# Patient Record
Sex: MALE | Race: WHITE | NOT HISPANIC OR LATINO | Employment: FULL TIME | URBAN - NONMETROPOLITAN AREA
[De-identification: names, ages, dates, MRNs, and addresses within clinical notes are randomized per-mention and may not be internally consistent; named-entity substitution may affect disease eponyms.]

---

## 2020-07-15 ENCOUNTER — HOSPITAL ENCOUNTER (INPATIENT)
Facility: HOSPITAL | Age: 55
LOS: 3 days | DRG: 389 | End: 2020-07-18
Attending: EMERGENCY MEDICINE | Admitting: INTERNAL MEDICINE
Payer: COMMERCIAL

## 2020-07-15 ENCOUNTER — APPOINTMENT (EMERGENCY)
Dept: RADIOLOGY | Facility: HOSPITAL | Age: 55
DRG: 389 | End: 2020-07-15
Payer: COMMERCIAL

## 2020-07-15 ENCOUNTER — APPOINTMENT (EMERGENCY)
Dept: CT IMAGING | Facility: HOSPITAL | Age: 55
DRG: 389 | End: 2020-07-15
Payer: COMMERCIAL

## 2020-07-15 DIAGNOSIS — R11.2 NAUSEA AND VOMITING: Primary | ICD-10-CM

## 2020-07-15 DIAGNOSIS — N17.9 AKI (ACUTE KIDNEY INJURY) (HCC): ICD-10-CM

## 2020-07-15 DIAGNOSIS — K56.609 SBO (SMALL BOWEL OBSTRUCTION) (HCC): ICD-10-CM

## 2020-07-15 DIAGNOSIS — E86.0 DEHYDRATION: ICD-10-CM

## 2020-07-15 DIAGNOSIS — R74.01 TRANSAMINITIS: ICD-10-CM

## 2020-07-15 PROBLEM — E87.1 HYPONATREMIA: Status: ACTIVE | Noted: 2020-07-15

## 2020-07-15 PROBLEM — F10.10 ALCOHOL ABUSE: Status: ACTIVE | Noted: 2020-07-15

## 2020-07-15 PROBLEM — R79.89 ELEVATED LFTS: Status: ACTIVE | Noted: 2020-07-15

## 2020-07-15 PROBLEM — Z72.0 TOBACCO USE: Status: ACTIVE | Noted: 2020-07-15

## 2020-07-15 LAB
ALBUMIN SERPL BCP-MCNC: 3.6 G/DL (ref 3.5–5)
ALBUMIN SERPL BCP-MCNC: 4.3 G/DL (ref 3.5–5)
ALP SERPL-CCNC: 51 U/L (ref 46–116)
ALP SERPL-CCNC: 59 U/L (ref 46–116)
ALT SERPL W P-5'-P-CCNC: 176 U/L (ref 12–78)
ALT SERPL W P-5'-P-CCNC: 222 U/L (ref 12–78)
ANION GAP SERPL CALCULATED.3IONS-SCNC: 13 MMOL/L (ref 4–13)
ANION GAP SERPL CALCULATED.3IONS-SCNC: 8 MMOL/L (ref 4–13)
ANION GAP SERPL CALCULATED.3IONS-SCNC: 8 MMOL/L (ref 4–13)
APTT PPP: 25 SECONDS (ref 23–37)
AST SERPL W P-5'-P-CCNC: 102 U/L (ref 5–45)
AST SERPL W P-5'-P-CCNC: 163 U/L (ref 5–45)
ATRIAL RATE: 100 BPM
BASOPHILS # BLD AUTO: 0.03 THOUSANDS/ΜL (ref 0–0.1)
BASOPHILS NFR BLD AUTO: 1 % (ref 0–1)
BILIRUB DIRECT SERPL-MCNC: 0.25 MG/DL (ref 0–0.2)
BILIRUB SERPL-MCNC: 0.7 MG/DL (ref 0.2–1)
BILIRUB SERPL-MCNC: 0.8 MG/DL (ref 0.2–1)
BUN SERPL-MCNC: 35 MG/DL (ref 5–25)
BUN SERPL-MCNC: 35 MG/DL (ref 5–25)
BUN SERPL-MCNC: 39 MG/DL (ref 5–25)
CALCIUM SERPL-MCNC: 10.4 MG/DL (ref 8.3–10.1)
CALCIUM SERPL-MCNC: 8.9 MG/DL (ref 8.3–10.1)
CALCIUM SERPL-MCNC: 9 MG/DL (ref 8.3–10.1)
CEA SERPL-MCNC: 2 NG/ML (ref 0–3)
CHLORIDE SERPL-SCNC: 89 MMOL/L (ref 100–108)
CHLORIDE SERPL-SCNC: 94 MMOL/L (ref 100–108)
CHLORIDE SERPL-SCNC: 94 MMOL/L (ref 100–108)
CO2 SERPL-SCNC: 26 MMOL/L (ref 21–32)
CO2 SERPL-SCNC: 27 MMOL/L (ref 21–32)
CO2 SERPL-SCNC: 31 MMOL/L (ref 21–32)
CREAT SERPL-MCNC: 1.3 MG/DL (ref 0.6–1.3)
CREAT SERPL-MCNC: 1.34 MG/DL (ref 0.6–1.3)
CREAT SERPL-MCNC: 1.67 MG/DL (ref 0.6–1.3)
CREAT UR-MCNC: 198 MG/DL
EOSINOPHIL # BLD AUTO: 0 THOUSAND/ΜL (ref 0–0.61)
EOSINOPHIL NFR BLD AUTO: 0 % (ref 0–6)
ERYTHROCYTE [DISTWIDTH] IN BLOOD BY AUTOMATED COUNT: 12.3 % (ref 11.6–15.1)
GFR SERPL CREATININE-BSD FRML MDRD: 45 ML/MIN/1.73SQ M
GFR SERPL CREATININE-BSD FRML MDRD: 59 ML/MIN/1.73SQ M
GFR SERPL CREATININE-BSD FRML MDRD: 61 ML/MIN/1.73SQ M
GLUCOSE SERPL-MCNC: 126 MG/DL (ref 65–140)
GLUCOSE SERPL-MCNC: 147 MG/DL (ref 65–140)
GLUCOSE SERPL-MCNC: 96 MG/DL (ref 65–140)
HAV IGM SER QL: NORMAL
HBV CORE IGM SER QL: NORMAL
HBV SURFACE AG SER QL: NORMAL
HCT VFR BLD AUTO: 49.7 % (ref 36.5–49.3)
HCV AB SER QL: NORMAL
HGB BLD-MCNC: 17.3 G/DL (ref 12–17)
IMM GRANULOCYTES # BLD AUTO: 0.02 THOUSAND/UL (ref 0–0.2)
IMM GRANULOCYTES NFR BLD AUTO: 0 % (ref 0–2)
INR PPP: 0.91 (ref 0.84–1.19)
LACTATE SERPL-SCNC: 1.3 MMOL/L (ref 0.5–2)
LACTATE SERPL-SCNC: 1.3 MMOL/L (ref 0.5–2)
LIPASE SERPL-CCNC: 119 U/L (ref 73–393)
LYMPHOCYTES # BLD AUTO: 0.5 THOUSANDS/ΜL (ref 0.6–4.47)
LYMPHOCYTES NFR BLD AUTO: 8 % (ref 14–44)
MAGNESIUM SERPL-MCNC: 1.8 MG/DL (ref 1.6–2.6)
MAGNESIUM SERPL-MCNC: 2 MG/DL (ref 1.6–2.6)
MCH RBC QN AUTO: 33.3 PG (ref 26.8–34.3)
MCHC RBC AUTO-ENTMCNC: 34.8 G/DL (ref 31.4–37.4)
MCV RBC AUTO: 96 FL (ref 82–98)
MONOCYTES # BLD AUTO: 0.63 THOUSAND/ΜL (ref 0.17–1.22)
MONOCYTES NFR BLD AUTO: 10 % (ref 4–12)
NEUTROPHILS # BLD AUTO: 5.06 THOUSANDS/ΜL (ref 1.85–7.62)
NEUTS SEG NFR BLD AUTO: 81 % (ref 43–75)
NRBC BLD AUTO-RTO: 0 /100 WBCS
P AXIS: 82 DEGREES
PHOSPHATE SERPL-MCNC: 5.8 MG/DL (ref 2.7–4.5)
PLATELET # BLD AUTO: 245 THOUSANDS/UL (ref 149–390)
PLATELET # BLD AUTO: 278 THOUSANDS/UL (ref 149–390)
PMV BLD AUTO: 9.2 FL (ref 8.9–12.7)
PMV BLD AUTO: 9.3 FL (ref 8.9–12.7)
POTASSIUM SERPL-SCNC: 3.9 MMOL/L (ref 3.5–5.3)
POTASSIUM SERPL-SCNC: 4.4 MMOL/L (ref 3.5–5.3)
POTASSIUM SERPL-SCNC: 4.6 MMOL/L (ref 3.5–5.3)
PR INTERVAL: 124 MS
PROCALCITONIN SERPL-MCNC: 0.6 NG/ML
PROT SERPL-MCNC: 7.3 G/DL (ref 6.4–8.2)
PROT SERPL-MCNC: 8.7 G/DL (ref 6.4–8.2)
PROTHROMBIN TIME: 12.3 SECONDS (ref 11.6–14.5)
QRS AXIS: 100 DEGREES
QRSD INTERVAL: 84 MS
QT INTERVAL: 312 MS
QTC INTERVAL: 402 MS
RBC # BLD AUTO: 5.2 MILLION/UL (ref 3.88–5.62)
SARS-COV-2 RNA RESP QL NAA+PROBE: NEGATIVE
SODIUM 24H UR-SCNC: 11 MOL/L
SODIUM SERPL-SCNC: 128 MMOL/L (ref 136–145)
SODIUM SERPL-SCNC: 129 MMOL/L (ref 136–145)
SODIUM SERPL-SCNC: 133 MMOL/L (ref 136–145)
T WAVE AXIS: 61 DEGREES
TROPONIN I SERPL-MCNC: <0.02 NG/ML
TROPONIN I SERPL-MCNC: <0.02 NG/ML
TSH SERPL DL<=0.05 MIU/L-ACNC: 1.34 UIU/ML (ref 0.36–3.74)
VENTRICULAR RATE: 100 BPM
WBC # BLD AUTO: 6.24 THOUSAND/UL (ref 4.31–10.16)

## 2020-07-15 PROCEDURE — 36415 COLL VENOUS BLD VENIPUNCTURE: CPT | Performed by: EMERGENCY MEDICINE

## 2020-07-15 PROCEDURE — 71045 X-RAY EXAM CHEST 1 VIEW: CPT

## 2020-07-15 PROCEDURE — 83605 ASSAY OF LACTIC ACID: CPT | Performed by: EMERGENCY MEDICINE

## 2020-07-15 PROCEDURE — 99223 1ST HOSP IP/OBS HIGH 75: CPT | Performed by: FAMILY MEDICINE

## 2020-07-15 PROCEDURE — 84484 ASSAY OF TROPONIN QUANT: CPT | Performed by: INTERNAL MEDICINE

## 2020-07-15 PROCEDURE — 84300 ASSAY OF URINE SODIUM: CPT | Performed by: FAMILY MEDICINE

## 2020-07-15 PROCEDURE — 83605 ASSAY OF LACTIC ACID: CPT | Performed by: FAMILY MEDICINE

## 2020-07-15 PROCEDURE — 85049 AUTOMATED PLATELET COUNT: CPT | Performed by: FAMILY MEDICINE

## 2020-07-15 PROCEDURE — 96376 TX/PRO/DX INJ SAME DRUG ADON: CPT

## 2020-07-15 PROCEDURE — 80053 COMPREHEN METABOLIC PANEL: CPT | Performed by: INTERNAL MEDICINE

## 2020-07-15 PROCEDURE — 85610 PROTHROMBIN TIME: CPT | Performed by: EMERGENCY MEDICINE

## 2020-07-15 PROCEDURE — 80074 ACUTE HEPATITIS PANEL: CPT | Performed by: INTERNAL MEDICINE

## 2020-07-15 PROCEDURE — 74177 CT ABD & PELVIS W/CONTRAST: CPT

## 2020-07-15 PROCEDURE — 93010 ELECTROCARDIOGRAM REPORT: CPT | Performed by: INTERNAL MEDICINE

## 2020-07-15 PROCEDURE — 99285 EMERGENCY DEPT VISIT HI MDM: CPT

## 2020-07-15 PROCEDURE — 85730 THROMBOPLASTIN TIME PARTIAL: CPT | Performed by: EMERGENCY MEDICINE

## 2020-07-15 PROCEDURE — 93005 ELECTROCARDIOGRAM TRACING: CPT

## 2020-07-15 PROCEDURE — 87635 SARS-COV-2 COVID-19 AMP PRB: CPT | Performed by: INTERNAL MEDICINE

## 2020-07-15 PROCEDURE — 80048 BASIC METABOLIC PNL TOTAL CA: CPT | Performed by: FAMILY MEDICINE

## 2020-07-15 PROCEDURE — 84145 PROCALCITONIN (PCT): CPT | Performed by: INTERNAL MEDICINE

## 2020-07-15 PROCEDURE — 99254 IP/OBS CNSLTJ NEW/EST MOD 60: CPT | Performed by: SURGERY

## 2020-07-15 PROCEDURE — 84100 ASSAY OF PHOSPHORUS: CPT | Performed by: INTERNAL MEDICINE

## 2020-07-15 PROCEDURE — 84443 ASSAY THYROID STIM HORMONE: CPT | Performed by: EMERGENCY MEDICINE

## 2020-07-15 PROCEDURE — 80076 HEPATIC FUNCTION PANEL: CPT | Performed by: EMERGENCY MEDICINE

## 2020-07-15 PROCEDURE — 83735 ASSAY OF MAGNESIUM: CPT | Performed by: INTERNAL MEDICINE

## 2020-07-15 PROCEDURE — 84484 ASSAY OF TROPONIN QUANT: CPT | Performed by: EMERGENCY MEDICINE

## 2020-07-15 PROCEDURE — 96361 HYDRATE IV INFUSION ADD-ON: CPT

## 2020-07-15 PROCEDURE — 99285 EMERGENCY DEPT VISIT HI MDM: CPT | Performed by: EMERGENCY MEDICINE

## 2020-07-15 PROCEDURE — 80048 BASIC METABOLIC PNL TOTAL CA: CPT | Performed by: EMERGENCY MEDICINE

## 2020-07-15 PROCEDURE — 96374 THER/PROPH/DIAG INJ IV PUSH: CPT

## 2020-07-15 PROCEDURE — 85025 COMPLETE CBC W/AUTO DIFF WBC: CPT | Performed by: EMERGENCY MEDICINE

## 2020-07-15 PROCEDURE — 82570 ASSAY OF URINE CREATININE: CPT | Performed by: FAMILY MEDICINE

## 2020-07-15 PROCEDURE — 83735 ASSAY OF MAGNESIUM: CPT | Performed by: EMERGENCY MEDICINE

## 2020-07-15 PROCEDURE — 82378 CARCINOEMBRYONIC ANTIGEN: CPT | Performed by: FAMILY MEDICINE

## 2020-07-15 PROCEDURE — 83690 ASSAY OF LIPASE: CPT | Performed by: EMERGENCY MEDICINE

## 2020-07-15 RX ORDER — SODIUM CHLORIDE 9 MG/ML
100 INJECTION, SOLUTION INTRAVENOUS CONTINUOUS
Status: DISCONTINUED | OUTPATIENT
Start: 2020-07-15 | End: 2020-07-18 | Stop reason: HOSPADM

## 2020-07-15 RX ORDER — LIDOCAINE HYDROCHLORIDE 20 MG/ML
1 JELLY TOPICAL ONCE
Status: COMPLETED | OUTPATIENT
Start: 2020-07-15 | End: 2020-07-15

## 2020-07-15 RX ORDER — ONDANSETRON 2 MG/ML
4 INJECTION INTRAMUSCULAR; INTRAVENOUS EVERY 4 HOURS PRN
Status: DISCONTINUED | OUTPATIENT
Start: 2020-07-15 | End: 2020-07-18 | Stop reason: HOSPADM

## 2020-07-15 RX ORDER — NICOTINE 21 MG/24HR
1 PATCH, TRANSDERMAL 24 HOURS TRANSDERMAL DAILY
Status: DISCONTINUED | OUTPATIENT
Start: 2020-07-15 | End: 2020-07-18 | Stop reason: HOSPADM

## 2020-07-15 RX ORDER — ONDANSETRON 2 MG/ML
4 INJECTION INTRAMUSCULAR; INTRAVENOUS ONCE
Status: COMPLETED | OUTPATIENT
Start: 2020-07-15 | End: 2020-07-15

## 2020-07-15 RX ORDER — MORPHINE SULFATE 4 MG/ML
4 INJECTION, SOLUTION INTRAMUSCULAR; INTRAVENOUS EVERY 4 HOURS PRN
Status: DISCONTINUED | OUTPATIENT
Start: 2020-07-15 | End: 2020-07-18 | Stop reason: HOSPADM

## 2020-07-15 RX ORDER — LABETALOL 20 MG/4 ML (5 MG/ML) INTRAVENOUS SYRINGE
10 EVERY 4 HOURS PRN
Status: DISCONTINUED | OUTPATIENT
Start: 2020-07-15 | End: 2020-07-18 | Stop reason: HOSPADM

## 2020-07-15 RX ADMIN — LIDOCAINE HYDROCHLORIDE 1 APPLICATION: 20 JELLY TOPICAL at 11:07

## 2020-07-15 RX ADMIN — ONDANSETRON 4 MG: 2 INJECTION INTRAMUSCULAR; INTRAVENOUS at 08:41

## 2020-07-15 RX ADMIN — SODIUM CHLORIDE 125 ML/HR: 0.9 INJECTION, SOLUTION INTRAVENOUS at 11:28

## 2020-07-15 RX ADMIN — SODIUM CHLORIDE 1000 ML: 0.9 INJECTION, SOLUTION INTRAVENOUS at 09:23

## 2020-07-15 RX ADMIN — ONDANSETRON 4 MG: 2 INJECTION INTRAMUSCULAR; INTRAVENOUS at 10:33

## 2020-07-15 RX ADMIN — SODIUM CHLORIDE 1000 ML: 0.9 INJECTION, SOLUTION INTRAVENOUS at 08:03

## 2020-07-15 RX ADMIN — SODIUM CHLORIDE 125 ML/HR: 0.9 INJECTION, SOLUTION INTRAVENOUS at 22:20

## 2020-07-15 RX ADMIN — IOHEXOL 100 ML: 350 INJECTION, SOLUTION INTRAVENOUS at 09:30

## 2020-07-15 RX ADMIN — ENOXAPARIN SODIUM 40 MG: 40 INJECTION SUBCUTANEOUS at 15:20

## 2020-07-15 RX ADMIN — FOLIC ACID: 5 INJECTION, SOLUTION INTRAMUSCULAR; INTRAVENOUS; SUBCUTANEOUS at 11:57

## 2020-07-15 NOTE — ASSESSMENT & PLAN NOTE
Likely secondary to prerenal azotemia in the setting of decreased p o   Intake  Check urine sodium and urine creatinine  No sign of obstructive uropathy on CT scan  Normal saline at 125cc/hr

## 2020-07-15 NOTE — CONSULTS
Consultation -General Surgery  Julisa Burns 53 y o  male MRN: [id number]  Unit/Bed#: RM07 Encounter: [id number]        Inpatient consult to Acute Care Surgery  Consult performed by: Zoë Guevara PA-C  Consult ordered by: Flor Lucero MD          ASSESSMENT:  Problem List     * (Principal) Small bowel obstruction ([facility] [address] )    Alcohol abuse    Tobacco use          52 yo M PMH ETOH abuse, with high grade SBO as seen on CT scan  Abd soft, distended  Nontender  Non peritoneal  No BM/flatus since Sunday  Lactate normal at 1 3    1 4 L of bilious output out of NGT on insertion in ED  Tachycardic, HTN in the ED  improving  Afebrile, no leukocytosis  Hyponatremia, 128  Cr 1 67  Transaminitis, likely 2/2 ETOH abuse  Hepatitis panel pending  Procal pending    Plan: Will plan to treat conservatively with NGT/NPO  No surgical intervention required at this time  If patient does not improve or continues to worsen, will reconsider need for operative management  - admit to medicine  - NGT/NPO/IVF  - CIWA protocol   - prn pain and antiemetic regimen  - encourage oob, ambulation in halls  - serial abdominal exams  - f/u hepatitis panel, procalcitonin    Rest of care per primary medicine team  Discussed with Dr Yomaira Contreras      Reason for Consult / Principal Problem:    HPI: Julisa Burns is a 53y o  year old male PMH ETOH abuse presents with nausea/vomiting since Sunday  Per pt, he experienced some b/l abdominal pain and then started to vomit, states "thought I had the stomach bug"  States emesis was initially intermittent but overnight has worsened significantly  Pain then subsided  CT scan in ED revealed a high grade SBO  Pt denies hx of SBO or these sx in the past  Denies any surgical hx except for a knee arthroscopy  States last BM was Sunday, has not passed flatus since then either  Pt not experiencing any abdominal pain currently  States nausea has significantly improved since NGT placement   Denies fevers, chills, cp, sob, changes to bladder  Hx of ETOH abuse, drinks 6-8 12oz beers every night x10 years  Tobacco use  Denies any elicit drug use  Review of Systems   Constitutional: Positive for appetite change  Negative for chills  Respiratory: Negative for shortness of breath  Cardiovascular: Negative for chest pain  Gastrointestinal: Positive for abdominal distention, abdominal pain, constipation, nausea and vomiting  Negative for diarrhea  Psychiatric/Behavioral: Negative for agitation  All other systems reviewed and are negative  Historical Information   History reviewed  No pertinent past medical history  Past Surgical History:   Procedure Laterality Date    KNEE ARTHROSCOPY       Social History   Social History     Substance and Sexual Activity   Alcohol Use Yes    Alcohol/week: 42 0 standard drinks    Types: 42 Cans of beer per week    Frequency: 4 or more times a week    Drinks per session: 5 or 6    Binge frequency: Daily or almost daily     Social History     Substance and Sexual Activity   Drug Use Not Currently     Social History     Tobacco Use   Smoking Status Current Every Day Smoker    Types: Cigarettes   Smokeless Tobacco Never Used     History reviewed  No pertinent family history  Meds/Allergies       (Not in a hospital admission)  Current Facility-Administered Medications   Medication Dose Route Frequency    folic acid 1 mg, thiamine (VITAMIN B1) 100 mg in sodium chloride 0 9 % 100 mL IV piggyback   Intravenous Daily    Labetalol HCl (NORMODYNE) injection 10 mg  10 mg Intravenous Q4H PRN    sodium chloride 0 9 % infusion  125 mL/hr Intravenous Continuous       No Known Allergies    Objective     Blood pressure (!) 194/100, pulse (!) 111, temperature 97 8 °F (36 6 °C), temperature source Temporal, resp  rate (!) 25, weight 78 5 kg (173 lb 1 oz), SpO2 96 %      Intake/Output Summary (Last 24 hours) at 7/15/2020 1136  Last data filed at 7/15/2020 1109  Gross per 24 hour   Intake 1000 ml   Output 1400 ml   Net -400 ml       PHYSICAL EXAM  Physical Exam   Constitutional: He is oriented to person, place, and time  He appears well-developed and well-nourished  No distress  HENT:   Head: Normocephalic and atraumatic  NGT in place, with bilious output    Eyes: EOM are normal    Neck: Neck supple  Cardiovascular: Regular rhythm and normal heart sounds  Exam reveals no gallop and no friction rub  No murmur heard  tachycardic   Pulmonary/Chest: Effort normal and breath sounds normal  No stridor  No respiratory distress  He has no wheezes  Abdominal: Soft  He exhibits distension  He exhibits no mass  There is no tenderness  There is no rebound and no guarding  Very minimal bowel sounds throughout  Abdomen soft, distended  Nontender  No peritoneal signs  Musculoskeletal: He exhibits no edema or deformity  Neurological: He is alert and oriented to person, place, and time  Skin: Skin is warm and dry  Capillary refill takes less than 2 seconds  He is not diaphoretic  Psychiatric: He has a normal mood and affect  His behavior is normal    Nursing note and vitals reviewed  Lab Results:   I have personally reviewed pertinent lab results    , CBC:   Lab Results   Component Value Date    WBC 6 24 07/15/2020    HGB 17 3 (H) 07/15/2020    HCT 49 7 (H) 07/15/2020    MCV 96 07/15/2020     07/15/2020    MCH 33 3 07/15/2020    MCHC 34 8 07/15/2020    RDW 12 3 07/15/2020    MPV 9 3 07/15/2020    NRBC 0 07/15/2020   , CMP:   Lab Results   Component Value Date    SODIUM 128 (L) 07/15/2020    K 4 6 07/15/2020    CL 89 (L) 07/15/2020    CO2 26 07/15/2020    BUN 35 (H) 07/15/2020    CREATININE 1 67 (H) 07/15/2020    CALCIUM 10 4 (H) 07/15/2020     (H) 07/15/2020     (H) 07/15/2020    ALKPHOS 59 07/15/2020    EGFR 45 07/15/2020   , Coagulation:   Lab Results   Component Value Date    INR 0 91 07/15/2020   , Urinalysis: No results found for: Gregory Arguello, SPECPAUL, HAYDEE, LEUKOCYTESUR, NITRITE, PROTEINUA, GLUCOSEU, KETONESU, BILIRUBINUR, BLOODU, Amylase: No results found for: AMYLASE, Lipase:   Lab Results   Component Value Date    LIPASE 119 07/15/2020     Imaging: I have personally reviewed pertinent reports  7/15 CT abd/pelvis w contrast: IMPRESSION:     High-grade small bowel obstruction with the transition point noted in the right midabdomen (image 66 of series 601)  Maximal small bowel transverse diameter measures approximately 4 5 cm  Marked dilatation of the stomach secondary to the bowel   obstruction  No pneumoperitoneum or ascites      Surgical consultation is recommended        EKG, Pathology, and Other Studies: I have personally reviewed pertinent reports  Counseling / Coordination of Care  Total time spent today  30 minutes  Greater than 50% of total time was spent with the patient and / or family counseling and / or coordination of care           Claribel Lira PA-C  7/15/2020 11:36 AM

## 2020-07-15 NOTE — H&P
H&P Exam - Gerald Tillman 53 y o  male MRN: [id number]    Unit/Bed#: RM07 Encounter: [id number]      * Small bowel obstruction [facility])  Assessment & Plan  Admit to medicine  Surgical consult  NG to low intermittent suction  Supportive care in the form of IV fluids, antiemetics and pain control    Hyponatremia  Assessment & Plan  Likely hypovolemic hyponatremia in the setting of decreased p o  Intake  Recheck BMP in 4 hours    Elevated LFTs  Assessment & Plan  Likely secondary to alcohol use  Hold hepatotoxic agents  Check acute hepatitis panel    Acute renal failure (ARF) (HCC)  Assessment & Plan  Likely secondary to prerenal azotemia in the setting of decreased p o  Intake  Check urine sodium and urine creatinine  No sign of obstructive uropathy on CT scan  Normal saline at 125cc/hr    Tobacco use  Assessment & Plan  Nicotine patch    Alcohol abuse  Assessment & Plan  Drinks 6-8 beers a day  Initiate CIWA protocol    History of Present Illness      Patient is a pleasant 50-year-old male with no significant past medical history or surgical history, presents to the ED today with 3 day history of abdominal pain accompanied by nausea and vomiting  Patient developed abdominal pain is diffuse on Sunday evening  Patient felt that it was a stomach bug and was drinking liquids, last evening he had a hot dog in some crackers but continued to have emesis and presents to the ED today for evaluation  On further questioning patient states that his stool caliber has been thin for last 2 months  He denies any melena hematochezia  No unintentional weight loss  He smokes 2 packs a day and drinks 6-8 beers a day  Last drink was on Sunday  Review of Systems   Gastrointestinal: Positive for abdominal pain, nausea and vomiting  Decrease in stool caliber   All other systems reviewed and are negative  Historical Information   History reviewed  No pertinent past medical history    Past Surgical History:   Procedure Laterality Date    KNEE ARTHROSCOPY       Social History   Social History     Substance and Sexual Activity   Alcohol Use Yes    Alcohol/week: 42 0 standard drinks    Types: 42 Cans of beer per week    Frequency: 4 or more times a week    Drinks per session: 5 or 6    Binge frequency: Daily or almost daily     Social History     Substance and Sexual Activity   Drug Use Not Currently     Social History     Tobacco Use   Smoking Status Current Every Day Smoker    Types: Cigarettes   Smokeless Tobacco Never Used     E-Cigarette Use: Never User     E-Cigarette/Vaping Substances       Family History: non-contributory    Meds/Allergies   all medications and allergies reviewed  No Known Allergies    Objective   First Vitals:   Blood Pressure: (!) 172/104 (07/15/20 0749)  Pulse: (!) 121 (07/15/20 0749)  Temperature: 97 8 °F (36 6 °C) (07/15/20 0749)  Temp Source: Temporal (07/15/20 0749)  Respirations: 17 (07/15/20 0749)  Weight - Scale: 78 5 kg (173 lb 1 oz) (07/15/20 0749)  SpO2: 96 % (07/15/20 0749)    Current Vitals:   Blood Pressure: (!) 194/100 (07/15/20 1000)  Pulse: (!) 111 (07/15/20 1000)  Temperature: 97 8 °F (36 6 °C) (07/15/20 0749)  Temp Source: Temporal (07/15/20 0749)  Respirations: (!) 25 (07/15/20 1000)  Weight - Scale: 78 5 kg (173 lb 1 oz) (07/15/20 0749)  SpO2: 96 % (07/15/20 1000)      Intake/Output Summary (Last 24 hours) at 7/15/2020 1216  Last data filed at 7/15/2020 1109  Gross per 24 hour   Intake 1000 ml   Output 1400 ml   Net -400 ml       Invasive Devices     Peripheral Intravenous Line            Peripheral IV 07/15/20 Left Antecubital less than 1 day          Drain            NG/OG/Enteral Tube Nasogastric 18 Fr Left nares less than 1 day                Physical Exam   Constitutional: He appears well-developed  HENT:   Head: Normocephalic  Mouth/Throat: No oropharyngeal exudate  Eyes: No scleral icterus  Neck: Normal range of motion  No JVD present     Cardiovascular: Regular rhythm  Tachycardic   Pulmonary/Chest: Effort normal and breath sounds normal  No stridor  No respiratory distress  Abdominal: Soft  He exhibits distension  There is tenderness  Musculoskeletal: Normal range of motion  Neurological: He is alert  No cranial nerve deficit  Skin: Skin is warm  Capillary refill takes 2 to 3 seconds  He is not diaphoretic  No erythema  Lab Results:   Lab Results   Component Value Date    WBC 6 24 07/15/2020    HGB 17 3 (H) 07/15/2020    HCT 49 7 (H) 07/15/2020    MCV 96 07/15/2020     07/15/2020     Lab Results   Component Value Date    SODIUM 129 (L) 07/15/2020    K 4 4 07/15/2020    CL 94 (L) 07/15/2020    CO2 27 07/15/2020    AGAP 8 07/15/2020    BUN 35 (H) 07/15/2020    CREATININE 1 30 07/15/2020    GLUC 126 07/15/2020    CALCIUM 9 0 07/15/2020     (H) 07/15/2020     (H) 07/15/2020    ALKPHOS 51 07/15/2020    TP 7 3 07/15/2020    TBILI 0 70 07/15/2020    EGFR 61 07/15/2020       Imaging:   CT abdomen pelvis with contrast   Final Result      High-grade small bowel obstruction with the transition point noted in the right midabdomen (image 66 of series 601)  Maximal small bowel transverse diameter measures approximately 4 5 cm  Marked dilatation of the stomach secondary to the bowel    obstruction  No pneumoperitoneum or ascites  Surgical consultation is recommended  I personally discussed this study with Ashutosh Palmer on 7/15/2020 at 10:05 AM                       Workstation performed: [id number]         XR chest 1 view portable   Final Result      No acute cardiopulmonary disease  Elevated left hemidiaphragm with underlying gaseous distention of the left upper quadrant bowel loops  Consider dedicated abdominal imaging to further evaluate the bowel distention              Workstation performed: [id number]             EKG, Pathology, and Other Studies: Sinus Tach    Code Status: No Order  Advance Directive and Living Will: Power of :    POLST:      Counseling / Coordination of Care:

## 2020-07-15 NOTE — PLAN OF CARE
Problem: PAIN - ADULT  Goal: Verbalizes/displays adequate comfort level or baseline comfort level  Description  Interventions:  - Encourage patient to monitor pain and request assistance  - Assess pain using appropriate pain scale  - Administer analgesics based on type and severity of pain and evaluate response  - Implement non-pharmacological measures as appropriate and evaluate response  - Consider cultural and social influences on pain and pain management  - Notify physician/advanced practitioner if interventions unsuccessful or patient reports new pain  Outcome: Progressing     Problem: INFECTION - ADULT  Goal: Absence or prevention of progression during hospitalization  Description  INTERVENTIONS:  - Assess and monitor for signs and symptoms of infection  - Monitor lab/diagnostic results  - Monitor all insertion sites, i e  indwelling lines, tubes, and drains  - Monitor endotracheal if appropriate and nasal secretions for changes in amount and color  - [place] appropriate cooling/warming therapies per order  - Administer medications as ordered  - Instruct and encourage patient and family to use good hand hygiene technique  - Identify and instruct in appropriate isolation precautions for identified infection/condition  Outcome: Progressing     Problem: SAFETY ADULT  Goal: Patient will remain free of falls  Description  INTERVENTIONS:  - Assess patient frequently for physical needs  -  Identify cognitive and physical deficits and behaviors that affect risk of falls    -  [place] fall precautions as indicated by assessment   - Educate patient/family on patient safety including physical limitations  - Instruct patient to call for assistance with activity based on assessment  - Modify environment to reduce risk of injury  - Consider OT/PT consult to assist with strengthening/mobility  Outcome: Progressing  Goal: Maintain or return to baseline ADL function  Description  INTERVENTIONS:  -  Assess patient's ability to carry out ADLs; assess patient's baseline for ADL function and identify physical deficits which impact ability to perform ADLs (bathing, care of mouth/teeth, toileting, grooming, dressing, etc )  - Assess/evaluate cause of self-care deficits   - Assess range of motion  - Assess patient's mobility; develop plan if impaired  - Assess patient's need for assistive devices and provide as appropriate  - Encourage maximum independence but intervene and supervise when necessary  - Involve family in performance of ADLs  - Assess for home care needs following discharge   - Consider OT consult to assist with ADL evaluation and planning for discharge  - Provide patient education as appropriate  Outcome: Progressing  Goal: Maintain or return mobility status to optimal level  Description  INTERVENTIONS:  - Assess patient's baseline mobility status (ambulation, transfers, stairs, etc )    - Identify cognitive and physical deficits and behaviors that affect mobility  - Identify mobility aids required to assist with transfers and/or ambulation (gait belt, sit-to-stand, lift, walker, cane, etc )  - [place] fall precautions as indicated by assessment  - Record patient progress and toleration of activity level on Mobility SBAR; progress patient to next Phase/Stage  - Instruct patient to call for assistance with activity based on assessment  - Consider rehabilitation consult to assist with strengthening/weightbearing, etc   Outcome: Progressing     Problem: DISCHARGE PLANNING  Goal: Discharge to home or other facility with appropriate resources  Description  INTERVENTIONS:  - Identify barriers to discharge w/patient and caregiver  - Arrange for needed discharge resources and transportation as appropriate  - Identify discharge learning needs (meds, wound care, etc )  - Arrange for interpretive services to assist at discharge as needed  - Refer to Case Management Department for coordinating discharge planning if the patient needs post-hospital services based on physician/advanced practitioner order or complex needs related to functional status, cognitive ability, or social support system  Outcome: Progressing     Problem: Knowledge Deficit  Goal: Patient/family/caregiver demonstrates understanding of disease process, treatment plan, medications, and discharge instructions  Description  Complete learning assessment and assess knowledge base    Interventions:  - Provide teaching at level of understanding  - Provide teaching via preferred learning methods  Outcome: Progressing

## 2020-07-15 NOTE — ASSESSMENT & PLAN NOTE
Surgical input appreciated  NG to low intermittent suction  Supportive care in the form of IV fluids, antiemetics and pain control

## 2020-07-16 ENCOUNTER — APPOINTMENT (INPATIENT)
Dept: RADIOLOGY | Facility: HOSPITAL | Age: 55
DRG: 389 | End: 2020-07-16
Payer: COMMERCIAL

## 2020-07-16 LAB
ALBUMIN SERPL BCP-MCNC: 3.4 G/DL (ref 3.5–5)
ALP SERPL-CCNC: 44 U/L (ref 46–116)
ALT SERPL W P-5'-P-CCNC: 117 U/L (ref 12–78)
ANION GAP SERPL CALCULATED.3IONS-SCNC: 12 MMOL/L (ref 4–13)
AST SERPL W P-5'-P-CCNC: 39 U/L (ref 5–45)
BACTERIA UR QL AUTO: ABNORMAL /HPF
BASOPHILS # BLD MANUAL: 0 THOUSAND/UL (ref 0–0.1)
BASOPHILS NFR MAR MANUAL: 0 % (ref 0–1)
BILIRUB SERPL-MCNC: 0.7 MG/DL (ref 0.2–1)
BILIRUB UR QL STRIP: ABNORMAL
BUN SERPL-MCNC: 44 MG/DL (ref 5–25)
CALCIUM SERPL-MCNC: 8.5 MG/DL (ref 8.3–10.1)
CHLORIDE SERPL-SCNC: 94 MMOL/L (ref 100–108)
CLARITY UR: CLEAR
CO2 SERPL-SCNC: 32 MMOL/L (ref 21–32)
COLOR UR: ABNORMAL
CREAT SERPL-MCNC: 1.3 MG/DL (ref 0.6–1.3)
EOSINOPHIL # BLD MANUAL: 0.03 THOUSAND/UL (ref 0–0.4)
EOSINOPHIL NFR BLD MANUAL: 1 % (ref 0–6)
ERYTHROCYTE [DISTWIDTH] IN BLOOD BY AUTOMATED COUNT: 12.5 % (ref 11.6–15.1)
GFR SERPL CREATININE-BSD FRML MDRD: 61 ML/MIN/1.73SQ M
GLUCOSE SERPL-MCNC: 89 MG/DL (ref 65–140)
GLUCOSE UR STRIP-MCNC: NEGATIVE MG/DL
HCT VFR BLD AUTO: 44.5 % (ref 36.5–49.3)
HGB BLD-MCNC: 15.1 G/DL (ref 12–17)
HGB UR QL STRIP.AUTO: NEGATIVE
HYALINE CASTS #/AREA URNS LPF: ABNORMAL /LPF
KETONES UR STRIP-MCNC: ABNORMAL MG/DL
LEUKOCYTE ESTERASE UR QL STRIP: NEGATIVE
LYMPHOCYTES # BLD AUTO: 0.97 THOUSAND/UL (ref 0.6–4.47)
LYMPHOCYTES # BLD AUTO: 28 % (ref 14–44)
MAGNESIUM SERPL-MCNC: 2.2 MG/DL (ref 1.6–2.6)
MCH RBC QN AUTO: 33.2 PG (ref 26.8–34.3)
MCHC RBC AUTO-ENTMCNC: 33.9 G/DL (ref 31.4–37.4)
MCV RBC AUTO: 98 FL (ref 82–98)
MONOCYTES # BLD AUTO: 0.48 THOUSAND/UL (ref 0–1.22)
MONOCYTES NFR BLD: 14 % (ref 4–12)
NEUTROPHILS # BLD MANUAL: 1.94 THOUSAND/UL (ref 1.85–7.62)
NEUTS BAND NFR BLD MANUAL: 6 % (ref 0–8)
NEUTS SEG NFR BLD AUTO: 50 % (ref 43–75)
NITRITE UR QL STRIP: NEGATIVE
NON-SQ EPI CELLS URNS QL MICRO: ABNORMAL /HPF
NRBC BLD AUTO-RTO: 0 /100 WBCS
PH UR STRIP.AUTO: 5.5 [PH]
PLATELET # BLD AUTO: 257 THOUSANDS/UL (ref 149–390)
PLATELET BLD QL SMEAR: ADEQUATE
PMV BLD AUTO: 9.5 FL (ref 8.9–12.7)
POTASSIUM SERPL-SCNC: 3.4 MMOL/L (ref 3.5–5.3)
PROCALCITONIN SERPL-MCNC: 0.43 NG/ML
PROT SERPL-MCNC: 7 G/DL (ref 6.4–8.2)
PROT UR STRIP-MCNC: ABNORMAL MG/DL
RBC # BLD AUTO: 4.55 MILLION/UL (ref 3.88–5.62)
RBC #/AREA URNS AUTO: ABNORMAL /HPF
SODIUM SERPL-SCNC: 138 MMOL/L (ref 136–145)
SP GR UR STRIP.AUTO: 1.02 (ref 1–1.03)
TOTAL CELLS COUNTED SPEC: 100
UROBILINOGEN UR QL STRIP.AUTO: 1 E.U./DL
VARIANT LYMPHS # BLD AUTO: 1 %
WBC # BLD AUTO: 3.46 THOUSAND/UL (ref 4.31–10.16)
WBC #/AREA URNS AUTO: ABNORMAL /HPF

## 2020-07-16 PROCEDURE — 99232 SBSQ HOSP IP/OBS MODERATE 35: CPT | Performed by: SURGERY

## 2020-07-16 PROCEDURE — 80053 COMPREHEN METABOLIC PANEL: CPT | Performed by: FAMILY MEDICINE

## 2020-07-16 PROCEDURE — 84145 PROCALCITONIN (PCT): CPT | Performed by: INTERNAL MEDICINE

## 2020-07-16 PROCEDURE — 99232 SBSQ HOSP IP/OBS MODERATE 35: CPT | Performed by: FAMILY MEDICINE

## 2020-07-16 PROCEDURE — 83735 ASSAY OF MAGNESIUM: CPT | Performed by: FAMILY MEDICINE

## 2020-07-16 PROCEDURE — 74022 RADEX COMPL AQT ABD SERIES: CPT

## 2020-07-16 PROCEDURE — 85027 COMPLETE CBC AUTOMATED: CPT | Performed by: FAMILY MEDICINE

## 2020-07-16 PROCEDURE — 81001 URINALYSIS AUTO W/SCOPE: CPT | Performed by: FAMILY MEDICINE

## 2020-07-16 PROCEDURE — 85007 BL SMEAR W/DIFF WBC COUNT: CPT | Performed by: FAMILY MEDICINE

## 2020-07-16 RX ORDER — POTASSIUM CHLORIDE 14.9 MG/ML
20 INJECTION INTRAVENOUS
Status: COMPLETED | OUTPATIENT
Start: 2020-07-16 | End: 2020-07-16

## 2020-07-16 RX ADMIN — FOLIC ACID: 5 INJECTION, SOLUTION INTRAMUSCULAR; INTRAVENOUS; SUBCUTANEOUS at 11:21

## 2020-07-16 RX ADMIN — ENOXAPARIN SODIUM 40 MG: 40 INJECTION SUBCUTANEOUS at 08:43

## 2020-07-16 RX ADMIN — SODIUM CHLORIDE 125 ML/HR: 0.9 INJECTION, SOLUTION INTRAVENOUS at 11:15

## 2020-07-16 RX ADMIN — SODIUM CHLORIDE 125 ML/HR: 0.9 INJECTION, SOLUTION INTRAVENOUS at 04:05

## 2020-07-16 RX ADMIN — POTASSIUM CHLORIDE 20 MEQ: 14.9 INJECTION, SOLUTION INTRAVENOUS at 08:44

## 2020-07-16 RX ADMIN — SODIUM CHLORIDE, SODIUM LACTATE, POTASSIUM CHLORIDE, AND CALCIUM CHLORIDE 1000 ML: .6; .31; .03; .02 INJECTION, SOLUTION INTRAVENOUS at 18:03

## 2020-07-16 RX ADMIN — SODIUM CHLORIDE 125 ML/HR: 0.9 INJECTION, SOLUTION INTRAVENOUS at 14:51

## 2020-07-16 RX ADMIN — POTASSIUM CHLORIDE 20 MEQ: 14.9 INJECTION, SOLUTION INTRAVENOUS at 12:15

## 2020-07-16 NOTE — PROGRESS NOTES
Progress Note - General Surgery   Chepe Brooks 53 y o  male MRN: [id number]  Unit/Bed#: 400-01 Encounter: [id number]    Assessment:  52 yo M with high grade SBO as seen on CT scan  Abd distended, but soft  Nontender  No abd pain, n/v    Transaminitis, likely 2/2 ETOH use  Improving   TACO, improving 1 3 (1 67 on admission)     NGT: 4 2 L bilious output    Plan:  - cont NGT due to high output  - NPO/IVF  - f/u CEA  - encourage oob, ambulation  - dvt ppx  - encourage deep breathing, IS  - prn pain and antiemetic regimen    Rest of care per primary medicine team      Subjective/Objective   Chief Complaint: "I feel better"    Subjective: Doing well, lying in bed resting comfortably  No acute complaints  States nausea, vomiting have resolved  Still without abdominal pain  Denies passing of gas or BM  Denies fevers, chills, cp, sob, change to bladder  Objective:     Blood pressure 146/96, pulse 100, temperature 98 2 °F (36 8 °C), resp  rate 18, height 5' 11" (1 803 m), weight 76 9 kg (169 lb 8 5 oz), SpO2 96 %  ,Body mass index is 23 65 kg/m²  Intake/Output Summary (Last 24 hours) at 7/16/2020 0802  Last data filed at 7/16/2020 0501  Gross per 24 hour   Intake 3177 08 ml   Output 6900 ml   Net -3722 92 ml       Invasive Devices     Peripheral Intravenous Line            Peripheral IV 07/15/20 Left Antecubital 1 day          Drain            NG/OG/Enteral Tube Nasogastric 18 Fr Left nares less than 1 day                Physical Exam   Constitutional: He is oriented to person, place, and time  He appears well-developed and well-nourished  No distress  HENT:   Head: Normocephalic and atraumatic  Eyes: EOM are normal    Neck: Neck supple  Cardiovascular: Normal rate, regular rhythm and normal heart sounds  Exam reveals no gallop and no friction rub  No murmur heard  Pulmonary/Chest: Effort normal and breath sounds normal  No stridor  No respiratory distress  He has no wheezes  He has no rales     Abdominal: Soft  He exhibits distension  He exhibits no mass  There is no tenderness  There is no rebound and no guarding  BS decreased but present throughout  Tympanic  Musculoskeletal: He exhibits no edema or deformity  Neurological: He is alert and oriented to person, place, and time  Skin: Skin is warm and dry  Capillary refill takes less than 2 seconds  He is not diaphoretic  Psychiatric: He has a normal mood and affect  His behavior is normal    Nursing note and vitals reviewed  Scheduled Meds:  Current Facility-Administered Medications:  enoxaparin 40 mg Subcutaneous Daily Loly Cisneros MD    folic acid 1 mg, thiamine 100 mg in 0 9% sodium chloride 100 mL IVPB  Intravenous Daily Loly Cisneros MD Last Rate: Stopped (07/15/20 1227)   Labetalol HCl 10 mg Intravenous Q4H PRN Loly Cisneros MD    morphine injection 2 mg Intravenous Q4H PRN Loly Cinseros MD    Or        morphine injection 4 mg Intravenous Q4H PRN Cristofer Rojas MD    nicotine 1 patch Transdermal Daily Loly Cisneros MD    ondansetron 4 mg Intravenous Q4H PRN Loly Cisneros MD    phenol 1 spray Mouth/Throat Q2H PRN Clcesar Ladd PA-C    potassium chloride 20 mEq Intravenous Q3H Clcesar Ladd PA-C    sodium chloride 125 mL/hr Intravenous Continuous Loly Cisneros MD Last Rate: 125 mL/hr (07/16/20 0405)     Continuous Infusions:  sodium chloride 125 mL/hr Last Rate: 125 mL/hr (07/16/20 0405)     PRN Meds: Labetalol HCl    morphine injection **OR** morphine injection    ondansetron    phenol      Lab, Imaging and other studies:  I have personally reviewed pertinent lab results    , CBC:   Lab Results   Component Value Date    WBC 3 46 (L) 07/16/2020    HGB 15 1 07/16/2020    HCT 44 5 07/16/2020    MCV 98 07/16/2020     07/16/2020    MCH 33 2 07/16/2020    MCHC 33 9 07/16/2020    RDW 12 5 07/16/2020    MPV 9 5 07/16/2020    NRBC 0 07/16/2020   , CMP:   Lab Results   Component Value Date    SODIUM 138 07/16/2020    K 3 4 (L) 07/16/2020    CL 94 (L) 07/16/2020    CO2 32 07/16/2020    BUN 44 (H) 07/16/2020    CREATININE 1 30 07/16/2020    CALCIUM 8 5 07/16/2020    AST 39 07/16/2020     (H) 07/16/2020    ALKPHOS 44 (L) 07/16/2020    EGFR 61 07/16/2020   , Coagulation:   Lab Results   Component Value Date    INR 0 91 07/15/2020   , Urinalysis:   Lab Results   Component Value Date    COLORU Jsesi 07/16/2020    CLARITYU Clear 07/16/2020    SPECGRAV 1 020 07/16/2020    PHUR 5 5 07/16/2020    LEUKOCYTESUR Negative 07/16/2020    NITRITE Negative 07/16/2020    GLUCOSEU Negative 07/16/2020    KETONESU 15 (1+) (A) 07/16/2020    BILIRUBINUR Interference- unable to analyze (A) 07/16/2020    BLOODU Negative 07/16/2020   , Amylase: No results found for: AMYLASE, Lipase:   Lab Results   Component Value Date    LIPASE 119 07/15/2020     VTE Pharmacologic Prophylaxis: Enoxaparin (Lovenox)  VTE Mechanical Prophylaxis: sequential compression device      Tammy Rizvi PA-C  7/16/2020 8:02 AM

## 2020-07-16 NOTE — PLAN OF CARE
Problem: PAIN - ADULT  Goal: Verbalizes/displays adequate comfort level or baseline comfort level  Description  Interventions:  - Encourage patient to monitor pain and request assistance  - Assess pain using appropriate pain scale  - Administer analgesics based on type and severity of pain and evaluate response  - Implement non-pharmacological measures as appropriate and evaluate response  - Consider cultural and social influences on pain and pain management  - Notify physician/advanced practitioner if interventions unsuccessful or patient reports new pain  Outcome: Progressing     Problem: INFECTION - ADULT  Goal: Absence or prevention of progression during hospitalization  Description  INTERVENTIONS:  - Assess and monitor for signs and symptoms of infection  - Monitor lab/diagnostic results  - Monitor all insertion sites, i e  indwelling lines, tubes, and drains  - Monitor endotracheal if appropriate and nasal secretions for changes in amount and color  - [place] appropriate cooling/warming therapies per order  - Administer medications as ordered  - Instruct and encourage patient and family to use good hand hygiene technique  - Identify and instruct in appropriate isolation precautions for identified infection/condition  Outcome: Progressing     Problem: SAFETY ADULT  Goal: Patient will remain free of falls  Description  INTERVENTIONS:  - Assess patient frequently for physical needs  -  Identify cognitive and physical deficits and behaviors that affect risk of falls    -  [place] fall precautions as indicated by assessment   - Educate patient/family on patient safety including physical limitations  - Instruct patient to call for assistance with activity based on assessment  - Modify environment to reduce risk of injury  - Consider OT/PT consult to assist with strengthening/mobility  Outcome: Progressing  Goal: Maintain or return to baseline ADL function  Description  INTERVENTIONS:  -  Assess patient's ability to carry out ADLs; assess patient's baseline for ADL function and identify physical deficits which impact ability to perform ADLs (bathing, care of mouth/teeth, toileting, grooming, dressing, etc )  - Assess/evaluate cause of self-care deficits   - Assess range of motion  - Assess patient's mobility; develop plan if impaired  - Assess patient's need for assistive devices and provide as appropriate  - Encourage maximum independence but intervene and supervise when necessary  - Involve family in performance of ADLs  - Assess for home care needs following discharge   - Consider OT consult to assist with ADL evaluation and planning for discharge  - Provide patient education as appropriate  Outcome: Progressing  Goal: Maintain or return mobility status to optimal level  Description  INTERVENTIONS:  - Assess patient's baseline mobility status (ambulation, transfers, stairs, etc )    - Identify cognitive and physical deficits and behaviors that affect mobility  - Identify mobility aids required to assist with transfers and/or ambulation (gait belt, sit-to-stand, lift, walker, cane, etc )  - [place] fall precautions as indicated by assessment  - Record patient progress and toleration of activity level on Mobility SBAR; progress patient to next Phase/Stage  - Instruct patient to call for assistance with activity based on assessment  - Consider rehabilitation consult to assist with strengthening/weightbearing, etc   Outcome: Progressing     Problem: DISCHARGE PLANNING  Goal: Discharge to home or other facility with appropriate resources  Description  INTERVENTIONS:  - Identify barriers to discharge w/patient and caregiver  - Arrange for needed discharge resources and transportation as appropriate  - Identify discharge learning needs (meds, wound care, etc )  - Arrange for interpretive services to assist at discharge as needed  - Refer to Case Management Department for coordinating discharge planning if the patient needs post-hospital services based on physician/advanced practitioner order or complex needs related to functional status, cognitive ability, or social support system  Outcome: Progressing     Problem: Knowledge Deficit  Goal: Patient/family/caregiver demonstrates understanding of disease process, treatment plan, medications, and discharge instructions  Description  Complete learning assessment and assess knowledge base  Interventions:  - Provide teaching at level of understanding  - Provide teaching via preferred learning methods  Outcome: Progressing     Problem: Potential for Falls  Goal: Patient will remain free of falls  Description  INTERVENTIONS:  - Assess patient frequently for physical needs  -  Identify cognitive and physical deficits and behaviors that affect risk of falls    -  [place] fall precautions as indicated by assessment   - Educate patient/family on patient safety including physical limitations  - Instruct patient to call for assistance with activity based on assessment  - Modify environment to reduce risk of injury  - Consider OT/PT consult to assist with strengthening/mobility  Outcome: Progressing

## 2020-07-16 NOTE — PROGRESS NOTES
Progress Note - Kassy Reasons 1965, 53 y o  male MRN: [id number]    Unit/Bed#: 400-01 Encounter: [id number]    Primary Care Provider: Isacc Tillman MD   Date and time admitted to hospital: 7/15/2020  7:44 AM        * Small bowel obstruction [facility])  Assessment & Plan    Surgical input appreciated  NG to low intermittent suction  Supportive care in the form of IV fluids, antiemetics and pain control  Follow up CEA     Hyponatremia  Assessment & Plan  Likely hypovolemic hyponatremia in the setting of decreased p o  Intake  Resolved    Elevated LFTs  Assessment & Plan  Likely secondary to alcohol use  Hold hepatotoxic agents  LFTs improving, hepatitis panel negative    Acute renal failure (ARF) (HCC)  Assessment & Plan  Likely secondary to prerenal azotemia in the setting of decreased p o  Intake  Check urine sodium and urine creatinine  No sign of obstructive uropathy on CT scan  Normal saline at 125cc/hr    Tobacco use  Assessment & Plan  Nicotine patch    Alcohol abuse  Assessment & Plan  Drinks 6-8 beers a day  Initiate CIWA protocol  Will initiate multivitamin once able to take p o  Progress Note - Kassy Reasons 53 y o  male MRN: [id number]    Unit/Bed#: 400-01 Encounter: [id number]        Subjective:   Patient seen examined at bedside, has not passed gas or had a bowel movement, states his belly is less tender    Objective:     Vitals:   Vitals:    07/16/20 0658   BP: 146/96   Pulse: 100   Resp: 18   Temp: 98 2 °F (36 8 °C)   SpO2: 96%     Body mass index is 23 65 kg/m²      Intake/Output Summary (Last 24 hours) at 7/16/2020 0805  Last data filed at 7/16/2020 0501  Gross per 24 hour   Intake 3177 08 ml   Output 6900 ml   Net -3722 92 ml       Physical Exam:   /96   Pulse 100   Temp 98 2 °F (36 8 °C)   Resp 18   Ht 5' 11" (1 803 m)   Wt 76 9 kg (169 lb 8 5 oz)   SpO2 96%   BMI 23 65 kg/m²   General appearance: alert and oriented, in no acute distress  Head: Normocephalic, without obvious abnormality, atraumatic  Lungs: clear to auscultation bilaterally  Heart: regular rate and rhythm, S1, S2 normal, no murmur, click, rub or gallop  Abdomen:  Soft, moderate distention, no rebound guarding or tenderness, no bowel sound  Extremities: extremities normal, warm and well-perfused; no cyanosis, clubbing, or edema  Pulses: 2+ and symmetric  Neurologic: Grossly normal     Invasive Devices     Peripheral Intravenous Line            Peripheral IV 07/15/20 Left Antecubital 1 day          Drain            NG/OG/Enteral Tube Nasogastric 18 Fr Left nares less than 1 day                Results from last 7 days   Lab Units 07/16/20  0433 07/15/20  1500 07/15/20  0805   WBC Thousand/uL 3 46*  --  6 24   HEMOGLOBIN g/dL 15 1  --  17 3*   HEMATOCRIT % 44 5  --  49 7*   PLATELETS Thousands/uL 257 245 278       Results from last 7 days   Lab Units 07/16/20  0433 07/15/20  1835 07/15/20  1145 07/15/20  0805   POTASSIUM mmol/L 3 4* 3 9 4 4 4 6   CHLORIDE mmol/L 94* 94* 94* 89*   CO2 mmol/L 32 31 27 26   BUN mg/dL 44* 39* 35* 35*   CREATININE mg/dL 1 30 1 34* 1 30 1 67*   CALCIUM mg/dL 8 5 8 9 9 0 10 4*   ALK PHOS U/L 44*  --  51 59   ALT U/L 117*  --  176* 222*   AST U/L 39  --  102* 163*       Medication Administration - last 24 hours from 07/15/2020 0805 to 07/16/2020 0805       Date/Time Order Dose Route Action Action by     07/15/2020 0839 sodium chloride 0 9 % bolus 1,000 mL 0 mL Intravenous Stopped Ishaan Kwon RN     07/15/2020 0841 ondansetron (ZOFRAN) injection 4 mg 4 mg Intravenous Given Ishaan Kwon RN     07/15/2020 1025 sodium chloride 0 9 % bolus 1,000 mL 0 mL Intravenous Stopped Jaret Lozoya RN     07/15/2020 0923 sodium chloride 0 9 % bolus 1,000 mL 1,000 mL Intravenous Raven  Jaret Lozoya, [address] [place] [address]     07/15/2020 0930 iohexol (OMNIPAQUE) 350 MG/ML injection (SINGLE-DOSE) 100 mL 100 mL Intravenous Given Julio Cesar Garcias     07/15/2020 1106 lidocaine (URO-JET) 2 % urethral/mucosal gel 1 application 1 application Urethral Given Elmira Mchugh, ASHLEY     07/15/2020 1033 ondansetron (ZOFRAN) injection 4 mg 4 mg Intravenous Given Elmira Mchugh RN     07/16/2020 0405 sodium chloride 0 9 % infusion 125 mL/hr Intravenous New Bag Justin Verduzco, ASHLEY     07/15/2020 2220 sodium chloride 0 9 % infusion 125 mL/hr Intravenous New [address] Justin Verduzco, RN     07/15/2020 1128 sodium chloride 0 9 % infusion 125 mL/hr Intravenous [address] Elmiraxiao Hilliardegel, [facility]     65/07/5523 4188 folic acid 1 mg, thiamine (VITAMIN B1) 100 mg in sodium chloride 0 9 % 100 mL IV piggyback 0  Intravenous Stopped Mirta Elias RN     99/64/1056 6293 folic acid 1 mg, thiamine (VITAMIN B1) 100 mg in sodium chloride 0 9 % 100 mL IV piggyback   Intravenous [address] Mirta Elias RN     07/15/2020 1508 nicotine (NICODERM CQ) 21 mg/24 hr TD 24 hr patch 1 patch 1 patch Transdermal Not Given Olimpia Pulido RN     07/15/2020 1520 enoxaparin (LOVENOX) subcutaneous injection 40 mg 40 mg Subcutaneous Given Olimpia Pulido RN            Lab, Imaging and other studies: I have personally reviewed pertinent reports      VTE Pharmacologic Prophylaxis: Enoxaparin (Lovenox)  VTE Mechanical Prophylaxis: sequential compression device     Anusha Girard MD  7/16/2020,8:05 AM

## 2020-07-16 NOTE — UTILIZATION REVIEW
Notification of Inpatient Admission/Inpatient Authorization Request   This is a Notification of Inpatient Admission for [address]  Be advised that this patient was admitted to our facility under Inpatient Status  Contact Alea Bales at [phone] for additional admission information  [facility] DEPT  DEDICATED [phone]  Patient Name:   Cal Pierre   YOB: 1965       [address]   [address]:   [address]  Tax ID: [id number]  NPI: [id number] Attending Provider/NPI:  Phone:  Address: [address] [[id number]]  [phone]  Same as Facility   Place of Service Code: 24 Place of Service Name:  [address] [place] [address]   Start Date: 7/15/20 1040 Discharge Date & Time: No discharge date for patient encounter  Type of Admission: Inpatient Status Discharge Disposition   (if discharged): Final discharge disposition not confirmed   Patient Diagnoses: Dehydration [E86 0]  Vomiting [R11 10]  SBO (small bowel obstruction) (HCC) [K56 609]  Nausea and vomiting [R11 2]  Transaminitis [R74 0]  TACO (acute kidney injury) ([facility] [address] ) [N17 9]     Orders: Admission Orders (From admission, onward)     Ordered        07/15/20 1040  Inpatient Admission  Once                    Assigned Utilization Review Contact: Alea Bales  Utilization   Network Utilization Review Department  Phone: [phone]; Fax [phone]  Email: [email or web address]   ATTENTION PAYERS: Please call the assigned Utilization  directly with any questions or concerns ALL voicemails in the department are confidential  Send all requests for admission clinical reviews, approved or denied determinations and any other requests to dedicated fax number belonging to the campus where the patient is receiving treatment

## 2020-07-16 NOTE — UTILIZATION REVIEW
Initial Clinical Review    Admission: Date/Time/Statement: Admission Orders (From admission, onward)     Ordered        07/15/20 1040  Inpatient Admission  Once                   Orders Placed This Encounter   Procedures    Inpatient Admission     Standing Status:   Standing     Number of Occurrences:   1     Order Specific Question:   Admitting Physician     Answer:   Jensen Pleasure     Order Specific Question:   Level of Care     Answer:   Med Surg [16]     Order Specific Question:   Estimated length of stay     Answer:   More than 2 Midnights     Order Specific Question:   Certification     Answer:   I certify that inpatient services are medically necessary for this patient for a duration of greater than two midnights  See H&P and MD Progress Notes for additional information about the patient's course of treatment  ED Arrival Information     Expected Arrival Acuity Means of Arrival Escorted By Service Admission Type    - 7/15/2020 07:38 Urgent Walk-In Spouse Hospitalist Urgent    Arrival Complaint    stomach virus        Chief Complaint   Patient presents with    Vomiting     States having stomach virus since Sunday night  Sunday night had 3 beers, felt nauseated  Monday with vomiting, normal BM  Yesterday attempted to eat, vomitted twice  No fevers  Assessment/Plan:   53 yom ambulatory to er from home c/o abd pain , n&v x 3 days, reports stool caliber thin x 2 months  Hx daily drinker @ 6-8 beers/day, smoker  Presents tachycardic & hypertensive with abd distention, pain & tenderness, n&v  Admission work-up showing SBO  Admitted to inpatient status for SBO, NGT placed, started on ivf, surgery consulted  Per surg:  No surgical intervention at this time, conservative tx with NGT      ED Triage Vitals   Temperature Pulse Respirations Blood Pressure SpO2   07/15/20 0749 07/15/20 0749 07/15/20 0749 07/15/20 0749 07/15/20 0749   97 8 °F (36 6 °C) (!) 121 17 (!) 172/104 96 %      Temp Source Heart Rate Source Patient Position - Orthostatic VS BP Location FiO2 (%)   07/15/20 0749 07/15/20 0749 07/15/20 1130 07/15/20 0749 --   Temporal Monitor Sitting Right arm       Pain Score       07/15/20 0749       5        Wt Readings from Last 1 Encounters:   07/15/20 76 9 kg (169 lb 8 5 oz)     Additional Vital Signs:   07/15/20 1000    111Abnormal   25Abnormal   194/100Abnormal   140  96 %           07/15/20 0945    97  20  177/90Abnormal   126  100 %           07/15/20 0800    122Abnormal   23Abnormal   158/95    96 %      None (Room air)     07/15/20 0754                  None (Room air)     07/15/20 0749  97 8 °F (36 6 °C)  121Abnormal   17  172/104Abnormal     96 %      None (Room air)     Pertinent Labs/Diagnostic Test Results:   Results from last 7 days   Lab Units 07/15/20  1132   SARS-COV-2  Negative     Results from last 7 days   Lab Units 07/16/20  0433 07/15/20  1500 07/15/20  0805   WBC Thousand/uL 3 46*  --  6 24   HEMOGLOBIN g/dL 15 1  --  17 3*   HEMATOCRIT % 44 5  --  49 7*   PLATELETS Thousands/uL 257 245 278   NEUTROS ABS Thousands/µL  --   --  5 06   BANDS PCT % 6  --   --      Results from last 7 days   Lab Units 07/16/20  0433 07/15/20  1835 07/15/20  1145 07/15/20  0805   SODIUM mmol/L 138 133* 129* 128*   POTASSIUM mmol/L 3 4* 3 9 4 4 4 6   CHLORIDE mmol/L 94* 94* 94* 89*   CO2 mmol/L 32 31 27 26   ANION GAP mmol/L 12 8 8 13   BUN mg/dL 44* 39* 35* 35*   CREATININE mg/dL 1 30 1 34* 1 30 1 67*   EGFR ml/min/1 73sq m 61 59 61 45   CALCIUM mg/dL 8 5 8 9 9 0 10 4*   MAGNESIUM mg/dL 2 2  --  1 8 2 0   PHOSPHORUS mg/dL  --   --  5 8*  --      Results from last 7 days   Lab Units 07/16/20  0433 07/15/20  1145 07/15/20  0805   AST U/L 39 102* 163*   ALT U/L 117* 176* 222*   ALK PHOS U/L 44* 51 59   TOTAL PROTEIN g/dL 7 0 7 3 8 7*   ALBUMIN g/dL 3 4* 3 6 4 3   TOTAL BILIRUBIN mg/dL 0 70 0 70 0 80   BILIRUBIN DIRECT mg/dL  --   --  0 25*     Results from last 7 days   Lab Units 07/16/20  0433 07/15/20  1835 07/15/20  1145 07/15/20  0805   GLUCOSE RANDOM mg/dL 89 96 126 147*     Results from last 7 days   Lab Units 07/15/20  1145 07/15/20  0805   TROPONIN I ng/mL <0 02 <0 02     Results from last 7 days   Lab Units 07/15/20  0805   PROTIME seconds 12 3   INR  0 91   PTT seconds 25     Results from last 7 days   Lab Units 07/15/20  0805   TSH 3RD GENERATON uIU/mL 1 342     Results from last 7 days   Lab Units 07/16/20  0433 07/15/20  1145   PROCALCITONIN ng/ml 0 43* 0 60*     Results from last 7 days   Lab Units 07/15/20  1459 07/15/20  1034   LACTIC ACID mmol/L 1 3 1 3     Results from last 7 days   Lab Units 07/15/20  1145   HEP B S AG  Non-reactive   HEP C AB  Non-reactive   HEP B C IGM  Non-reactive     Results from last 7 days   Lab Units 07/15/20  0805   LIPASE u/L 119     Results from last 7 days   Lab Units 07/15/20  1258   CEA ng/mL 2 0     Results from last 7 days   Lab Units 07/16/20  0118 07/15/20  1453   CLARITY UA  Clear  --    COLOR UA  Jessi  --    SPEC GRAV UA  1 020  --    PH UA  5 5  --    GLUCOSE UA mg/dl Negative  --    KETONES UA mg/dl 15 (1+)*  --    BLOOD UA  Negative  --    PROTEIN UA mg/dl 30 (1+)*  --    NITRITE UA  Negative  --    BILIRUBIN UA  Interference- unable to analyze*  --    UROBILINOGEN UA E U /dl 1 0  --    LEUKOCYTES UA  Negative  --    WBC UA /hpf None Seen  --    RBC UA /hpf 0-1*  --    BACTERIA UA /hpf Occasional  --    EPITHELIAL CELLS WET PREP /hpf None Seen  --    SODIUM UR   --  11   CREATININE UR mg/dL  --  198 0     Results from last 7 days   Lab Units 07/16/20  0433   TOTAL COUNTED  100     7/15  Cxr=No acute cardiopulmonary disease  Elevated left hemidiaphragm with underlying gaseous distention of the left upper quadrant bowel loops  Consider dedicated abdominal imaging to further evaluate the bowel distention  Ct a/p=High-grade small bowel obstruction with the transition point noted in the right midabdomen (image 66 of series 601)  Maximal small bowel transverse diameter measures approximately 4 5 cm  Marked dilatation of the stomach secondary to the bowel   obstruction  No pneumoperitoneum or ascites  Surgical consultation is recommended  7/16  Obstruction series=Nasogastric tube has been placed with tip in the stomach  There is unchanged severe small bowel distention consistent with small bowel obstruction      ED Treatment:   Medication Administration from 07/15/2020 0738 to 07/15/2020 1411       Date/Time Order Dose Route Action     07/15/2020 0803 sodium chloride 0 9 % bolus 1,000 mL 1,000 mL Intravenous New Bag     07/15/2020 0841 ondansetron (ZOFRAN) injection 4 mg 4 mg Intravenous Given     07/15/2020 0923 sodium chloride 0 9 % bolus 1,000 mL 1,000 mL Intravenous New Bag     07/15/2020 0930 iohexol (OMNIPAQUE) 350 MG/ML injection (SINGLE-DOSE) 100 mL 100 mL Intravenous Given     07/15/2020 1107 lidocaine (URO-JET) 2 % urethral/mucosal gel 1 application 1 application Urethral Given     07/15/2020 1033 ondansetron (ZOFRAN) injection 4 mg 4 mg Intravenous Given     07/15/2020 1128 sodium chloride 0 9 % infusion 125 mL/hr Intravenous New Bag     90/29/6104 7318 folic acid 1 mg, thiamine (VITAMIN B1) 100 mg in sodium chloride 0 9 % 100 mL IV piggyback   Intravenous New Bag        Admitting Diagnosis: Dehydration [E86 0]  Vomiting [R11 10]  SBO (small bowel obstruction) ([facility]) [K56 609]  Nausea and vomiting [R11 2]  Transaminitis [R74 0]  TACO (acute kidney injury) ([facility] [address] ) [N17 9]  Age/Sex: 53 y o  male  Admission Orders:  Npo/NGT  WA protocol  Consult surgery  Scd/foot pumps  Incentive spirometry    Scheduled Medications:  enoxaparin 40 mg Subcutaneous Daily   folic acid 1 mg, thiamine 100 mg in 0 9% sodium chloride 100 mL IVPB  Intravenous Daily   nicotine 1 patch Transdermal Daily   potassium chloride 20 mEq Intravenous Q3H     Continuous IV Infusions:  sodium chloride 125 mL/hr Intravenous Continuous     PRN Meds:  Labetalol HCl 10 mg Intravenous Q4H PRN   morphine injection 2 mg Intravenous Q4H PRN   Or      morphine injection 4 mg Intravenous Q4H PRN   ondansetron 4 mg Intravenous Q4H PRN   phenol 1 spray Mouth/Throat Q2H PRN     Network Utilization Review Department  [email or web address]  ATTENTION: Please call with any questions or concerns to [phone] and carefully listen to the prompts so that you are directed to the right person  All voicemails are confidential   Galina Farias all requests for admission clinical reviews, approved or denied determinations and any other requests to dedicated fax number below belonging to the campus where the patient is receiving treatment   List of dedicated fax numbers for the Facilities:  [address] DENIALS (Administrative/Medical Necessity) [phone]   [address] (Maternity/NICU/Pediatrics) [phone]   Aishwarya Momin [phone]   Michelle Gan [phone]   Cullen Opitz [phone]   Hernesto Figueredo [phone]   [facility] [facility] [phone]   [facility]  [phone]   [address] [facility]  [facility] [phone]

## 2020-07-17 LAB
ALBUMIN SERPL BCP-MCNC: 3.2 G/DL (ref 3.5–5)
ALP SERPL-CCNC: 40 U/L (ref 46–116)
ALT SERPL W P-5'-P-CCNC: 68 U/L (ref 12–78)
ANION GAP SERPL CALCULATED.3IONS-SCNC: 7 MMOL/L (ref 4–13)
AST SERPL W P-5'-P-CCNC: 19 U/L (ref 5–45)
BILIRUB SERPL-MCNC: 0.7 MG/DL (ref 0.2–1)
BUN SERPL-MCNC: 35 MG/DL (ref 5–25)
CALCIUM SERPL-MCNC: 8.5 MG/DL (ref 8.3–10.1)
CHLORIDE SERPL-SCNC: 100 MMOL/L (ref 100–108)
CO2 SERPL-SCNC: 33 MMOL/L (ref 21–32)
CREAT SERPL-MCNC: 1.12 MG/DL (ref 0.6–1.3)
ERYTHROCYTE [DISTWIDTH] IN BLOOD BY AUTOMATED COUNT: 12.1 % (ref 11.6–15.1)
GFR SERPL CREATININE-BSD FRML MDRD: 74 ML/MIN/1.73SQ M
GLUCOSE SERPL-MCNC: 73 MG/DL (ref 65–140)
HCT VFR BLD AUTO: 42.9 % (ref 36.5–49.3)
HGB BLD-MCNC: 14.3 G/DL (ref 12–17)
MAGNESIUM SERPL-MCNC: 2.6 MG/DL (ref 1.6–2.6)
MCH RBC QN AUTO: 33 PG (ref 26.8–34.3)
MCHC RBC AUTO-ENTMCNC: 33.3 G/DL (ref 31.4–37.4)
MCV RBC AUTO: 99 FL (ref 82–98)
NRBC BLD AUTO-RTO: 0 /100 WBCS
PHOSPHATE SERPL-MCNC: 3.1 MG/DL (ref 2.7–4.5)
PLATELET # BLD AUTO: 248 THOUSANDS/UL (ref 149–390)
PMV BLD AUTO: 9.5 FL (ref 8.9–12.7)
POTASSIUM SERPL-SCNC: 3.2 MMOL/L (ref 3.5–5.3)
PROT SERPL-MCNC: 6.8 G/DL (ref 6.4–8.2)
RBC # BLD AUTO: 4.33 MILLION/UL (ref 3.88–5.62)
SODIUM SERPL-SCNC: 140 MMOL/L (ref 136–145)
WBC # BLD AUTO: 3.18 THOUSAND/UL (ref 4.31–10.16)

## 2020-07-17 PROCEDURE — 85027 COMPLETE CBC AUTOMATED: CPT | Performed by: FAMILY MEDICINE

## 2020-07-17 PROCEDURE — 99232 SBSQ HOSP IP/OBS MODERATE 35: CPT | Performed by: SURGERY

## 2020-07-17 PROCEDURE — 84100 ASSAY OF PHOSPHORUS: CPT | Performed by: FAMILY MEDICINE

## 2020-07-17 PROCEDURE — 99232 SBSQ HOSP IP/OBS MODERATE 35: CPT | Performed by: FAMILY MEDICINE

## 2020-07-17 PROCEDURE — 83735 ASSAY OF MAGNESIUM: CPT | Performed by: FAMILY MEDICINE

## 2020-07-17 PROCEDURE — 80053 COMPREHEN METABOLIC PANEL: CPT | Performed by: FAMILY MEDICINE

## 2020-07-17 RX ORDER — POTASSIUM CHLORIDE 14.9 MG/ML
20 INJECTION INTRAVENOUS
Status: COMPLETED | OUTPATIENT
Start: 2020-07-17 | End: 2020-07-17

## 2020-07-17 RX ORDER — POTASSIUM CHLORIDE 14.9 MG/ML
20 INJECTION INTRAVENOUS
Status: DISCONTINUED | OUTPATIENT
Start: 2020-07-17 | End: 2020-07-17

## 2020-07-17 RX ADMIN — SODIUM CHLORIDE 100 ML/HR: 0.9 INJECTION, SOLUTION INTRAVENOUS at 12:09

## 2020-07-17 RX ADMIN — ONDANSETRON 4 MG: 2 INJECTION INTRAMUSCULAR; INTRAVENOUS at 02:55

## 2020-07-17 RX ADMIN — ENOXAPARIN SODIUM 40 MG: 40 INJECTION SUBCUTANEOUS at 09:02

## 2020-07-17 RX ADMIN — POTASSIUM CHLORIDE 20 MEQ: 14.9 INJECTION, SOLUTION INTRAVENOUS at 14:12

## 2020-07-17 RX ADMIN — POTASSIUM CHLORIDE 20 MEQ: 14.9 INJECTION, SOLUTION INTRAVENOUS at 12:14

## 2020-07-17 RX ADMIN — FOLIC ACID: 5 INJECTION, SOLUTION INTRAMUSCULAR; INTRAVENOUS; SUBCUTANEOUS at 09:02

## 2020-07-17 RX ADMIN — POTASSIUM CHLORIDE 20 MEQ: 14.9 INJECTION, SOLUTION INTRAVENOUS at 08:50

## 2020-07-17 RX ADMIN — SODIUM CHLORIDE 100 ML/HR: 0.9 INJECTION, SOLUTION INTRAVENOUS at 23:55

## 2020-07-17 NOTE — ASSESSMENT & PLAN NOTE
Surgical input appreciated  NG to low intermittent suction  Supportive care in the form of IV fluids, antiemetics and pain control  Patient passing some flatus 7/17/2020

## 2020-07-17 NOTE — SOCIAL WORK
LOS 2 DAYS  RISK OF UNPLANNED READMISSION SCORE 11  30 DAY READMISSION: NO  BUNDLE: NO    CM met with patient bedside  Patient resides in with his wife, 2 daughters, 3 dogs, and 3 cats in [place]  Patient denies any DME use and was indepdenent with ADLs PTA  Patient denied any Hx with VNA, STR, and MH  Patient states he smokes 2 PPD and drink 6-8 beers/daily  CM provided education on smoking cessation and offered D&A OP resources  Patient declined at this time  Patient stated his last beer was on Sunday  Patient follows Dr Fidel Medina as PCP  Patient prefers the [facility] in [facility] and identified no barriers to obtaining Rx from that location  Patient did not identify a POA, however PA  would defer decision-making to his wife if he were unable to make medical decisions  Patient is currently employed at [employer] in [facility]  Patient drives self to work, medical appointments, and for daily living  CM reviewed discharge planning process including the following: identifying help at home, patient preference for discharge planning needs, pharmacy preference, and availability of treatment team to discuss questions or concerns patient and/or family may have regarding understanding medications and recognizing signs and symptoms once discharged  CM also encouraged patient to follow up with all recommended appointments after discharge  Patient advised of importance for patient and family to participate in managing patients medical well being  CM name and role reviewed  Discharge Checklist reviewed and CM will continue to monitor for progress toward discharge goals in nursing and provider rounds  At this time, no CM needs  Patient's wife will transport home upon discharge  Patient currently on CIWA protocol and IVF  CM available through discharge

## 2020-07-17 NOTE — PROGRESS NOTES
Progress Note - General Surgery   Shaista Coe 53 y o  male MRN: [id number]  Unit/Bed#: 400-01 Encounter: [id number]    Assessment:  52 yo M with high grade SBO as seen on CT scan  NGT in place  Abd distended, soft  Nontender  +flatus, no BM as of yet  Obst series 7/16: SBO, unchanged  Hypokalemia, 3 2 (3 4)  TACO, resolved  Cr 1 12 (1 3)  Transaminitis, resolved  CEA: normal  Hx of ETOH abuse    NGT: 3L bilious output (4 2 L)    Plan:  - continue NGT due to high output  - NPO/IVF  - may have ice chips, but limit to one small cup per day  - prn pain and antiemetic regimen  - serial abdominal exams  - encourage oob, ambulation  - encourage deep breathing, IS    Rest of care per primary medicine team    Subjective/Objective     Subjective: Doing well, admits to passing flatus  States he is feeling better  Denies any abdominal pain, nausea, vomiting, cp, sob, changes to bladder  High NGT output but patient states he was consuming several cups of ice chips yesterday  Objective:     Blood pressure 145/81, pulse 104, temperature 98 °F (36 7 °C), resp  rate 18, height 5' 11" (1 803 m), weight 76 9 kg (169 lb 8 5 oz), SpO2 93 %  ,Body mass index is 23 65 kg/m²  Intake/Output Summary (Last 24 hours) at 7/17/2020 1633  Last data filed at 7/17/2020 1226  Gross per 24 hour   Intake 3821 25 ml   Output 3950 ml   Net -128 75 ml       Invasive Devices     Peripheral Intravenous Line            Peripheral IV 07/15/20 Left Antecubital 2 days          Drain            NG/OG/Enteral Tube Nasogastric 18 Fr Left nares 2 days                Physical Exam   Constitutional: He is oriented to person, place, and time  He appears well-developed and well-nourished  No distress  HENT:   Head: Normocephalic and atraumatic  Eyes: EOM are normal    Neck: Neck supple  Cardiovascular: Normal rate, regular rhythm and normal heart sounds  Exam reveals no gallop and no friction rub  No murmur heard    Pulmonary/Chest: Effort normal and breath sounds normal  No stridor  No respiratory distress  He has no wheezes  He has no rales  Abdominal: Soft  He exhibits distension  He exhibits no mass  There is no tenderness  There is no rebound and no guarding  Decreased bowel sounds throughout   Musculoskeletal: He exhibits no edema or deformity  Neurological: He is alert and oriented to person, place, and time  Skin: Skin is warm and dry  Capillary refill takes less than 2 seconds  He is not diaphoretic  Psychiatric: He has a normal mood and affect  His behavior is normal    Nursing note and vitals reviewed  Scheduled Meds:  Current Facility-Administered Medications:  enoxaparin 40 mg Subcutaneous Daily Loly Cisneros MD    folic acid 1 mg, thiamine 100 mg in 0 9% sodium chloride 100 mL IVPB  Intravenous Daily Trang Whiting MD Last Rate: 200 mL/hr at 07/16/20 1121   Labetalol HCl 10 mg Intravenous Q4H PRN Loly Cisneros MD    morphine injection 2 mg Intravenous Q4H PRN Trang Whiting MD    Or        morphine injection 4 mg Intravenous Q4H PRN Trang Whiting MD    nicotine 1 patch Transdermal Daily Loly Cisneros MD    ondansetron 4 mg Intravenous Q4H PRN Loly Cisneros MD    phenol 1 spray Mouth/Throat Q2H PRN Selena Cifuentes PA-C    sodium chloride 100 mL/hr Intravenous Continuous Loly Cisneros MD Last Rate: 100 mL/hr (07/17/20 1209)     Continuous Infusions:  sodium chloride 100 mL/hr Last Rate: 100 mL/hr (07/17/20 1209)     PRN Meds: Labetalol HCl    morphine injection **OR** morphine injection    ondansetron    phenol      Lab, Imaging and other studies:  I have personally reviewed pertinent lab results    , CBC:   Lab Results   Component Value Date    WBC 3 18 (L) 07/17/2020    HGB 14 3 07/17/2020    HCT 42 9 07/17/2020    MCV 99 (H) 07/17/2020     07/17/2020    MCH 33 0 07/17/2020    MCHC 33 3 07/17/2020    RDW 12 1 07/17/2020    MPV 9 5 07/17/2020    NRBC 0 07/17/2020   , CMP:   Lab Results   Component Value Date    SODIUM 140 07/17/2020    K 3 2 (L) 07/17/2020     07/17/2020    CO2 33 (H) 07/17/2020    BUN 35 (H) 07/17/2020    CREATININE 1 12 07/17/2020    CALCIUM 8 5 07/17/2020    AST 19 07/17/2020    ALT 68 07/17/2020    ALKPHOS 40 (L) 07/17/2020    EGFR 74 07/17/2020   , Coagulation: No results found for: PT, INR, APTT, Urinalysis: No results found for: COLORU, CLARITYU, SPECGRAV, PHUR, LEUKOCYTESUR, NITRITE, PROTEINUA, GLUCOSEU, KETONESU, BILIRUBINUR, BLOODU, Amylase: No results found for: AMYLASE, Lipase: No results found for: LIPASE  VTE Pharmacologic Prophylaxis: Enoxaparin (Lovenox)  VTE Mechanical Prophylaxis: sequential compression device      Andrés Harris PA-C  7/17/2020 4:33 PM

## 2020-07-17 NOTE — PROGRESS NOTES
Progress Note - Julisa Ask 1965, 53 y o  male MRN: [id number]    Unit/Bed#: 400-01 Encounter: [id number]    Primary Care Provider: Candida Montilla MD   Date and time admitted to hospital: 7/15/2020  7:44 AM        * Small bowel obstruction [facility])  Assessment & Plan  Surgical input appreciated  NG to low intermittent suction  Supportive care in the form of IV fluids, antiemetics and pain control  Patient passing some flatus 7/17/2020    Hyponatremia  Assessment & Plan  Likely hypovolemic hyponatremia in the setting of decreased p o  Intake  Resolved    Elevated LFTs  Assessment & Plan  Likely secondary to alcohol use  Hold hepatotoxic agents  LFTs improving, hepatitis panel negative  Resolved    Acute renal failure (ARF) (HCC)  Assessment & Plan  Likely secondary to prerenal azotemia in the setting of decreased p o  Intake  Check urine sodium and urine creatinine  No sign of obstructive uropathy on CT scan  Normal saline at 125cc/hr    Tobacco use  Assessment & Plan  Nicotine patch    Alcohol abuse  Assessment & Plan  Drinks 6-8 beers a day  Initiate CIWA protocol  Will initiate multivitamin once able to take p o  Progress Note - Julisa Ask 53 y o  male MRN: [id number]    Unit/Bed#: 400-01 Encounter: [id number]        Subjective:   Patient seen and examined at bedside  Had some flatus and continues to feel better     Objective:     Vitals:   Vitals:    07/17/20 0636   BP: 144/74   Pulse: 100   Resp: 18   Temp: 99 °F (37 2 °C)   SpO2: (!) 88%     Body mass index is 23 65 kg/m²      Intake/Output Summary (Last 24 hours) at 7/17/2020 1023  Last data filed at 7/17/2020 0501  Gross per 24 hour   Intake 3750 83 ml   Output 3650 ml   Net 100 83 ml       Physical Exam:   /74   Pulse 100   Temp 99 °F (37 2 °C)   Resp 18   Ht 5' 11" (1 803 m)   Wt 76 9 kg (169 lb 8 5 oz)   SpO2 (!) 88%   BMI 23 65 kg/m²   General appearance: alert and oriented, in no acute distress  Head: Normocephalic, without obvious abnormality, atraumatic  Lungs: clear to auscultation bilaterally  Heart: regular rate and rhythm, S1, S2 normal, no murmur, click, rub or gallop  Abdomen: some bowel sounds, but less distended   Extremities: extremities normal, warm and well-perfused; no cyanosis, clubbing, or edema  Skin: Skin color, texture, turgor normal  No rashes or lesions  Neurologic: Grossly normal     Invasive Devices     Peripheral Intravenous Line            Peripheral IV 07/15/20 Left Antecubital 2 days          Drain            NG/OG/Enteral Tube Nasogastric 18 Fr Left nares 1 day                Results from last 7 days   Lab Units 07/17/20  0434 07/16/20  0433 07/15/20  1500 07/15/20  0805   WBC Thousand/uL 3 18* 3 46*  --  6 24   HEMOGLOBIN g/dL 14 3 15 1  --  17 3*   HEMATOCRIT % 42 9 44 5  --  49 7*   PLATELETS Thousands/uL 248 257 245 278       Results from last 7 days   Lab Units 07/17/20  0434 07/16/20  0433 07/15/20  1835 07/15/20  1145   POTASSIUM mmol/L 3 2* 3 4* 3 9 4 4   CHLORIDE mmol/L 100 94* 94* 94*   CO2 mmol/L 33* 32 31 27   BUN mg/dL 35* 44* 39* 35*   CREATININE mg/dL 1 12 1 30 1 34* 1 30   CALCIUM mg/dL 8 5 8 5 8 9 9 0   ALK PHOS U/L 40* 44*  --  51   ALT U/L 68 117*  --  176*   AST U/L 19 39  --  102*       Medication Administration - last 24 hours from 07/16/2020 1023 to 07/17/2020 1023       Date/Time Order Dose Route Action Action by     07/16/2020 1451 sodium chloride 0 9 % infusion 125 mL/hr Intravenous [address] Ellie Santoro RN     07/16/2020 1115 sodium chloride 0 9 % infusion 125 mL/hr Intravenous [address] \[facility]\""     36/67/9369 5216 folic acid 1 mg, thiamine (VITAMIN B1) 100 mg in sodium chloride 0 9 % 100 mL IV piggyback   Intravenous [address] Amaris Jose Armando     38/37/8107 3976 folic acid 1 mg, thiamine (VITAMIN B1) 100 mg in sodium chloride 0 9 % 100 mL IV piggyback   Intravenous [address] Harvey Gudino RN     07/17/2020 7616 nicotine (NICODERM CQ) 21 mg/24 hr TD 24 hr patch 1 patch 1 patch Transdermal Not Given Bailee Wilkes     07/17/2020 0902 enoxaparin (LOVENOX) subcutaneous injection 40 mg 40 mg Subcutaneous Given Amarisivania Suárez     07/17/2020 0255 ondansetron (ZOFRAN) injection 4 mg 4 mg Intravenous Given Ethan Eckert RN     07/16/2020 1215 potassium chloride 20 mEq IVPB (premix) 20 mEq Intravenous [address] Dorna ASHLEY Stahl     07/17/2020 0255 lactated ringers bolus 1,000 mL 0 mL Intravenous Stopped Ethan ASHLEY Eckert     07/16/2020 1803 lactated ringers bolus 1,000 mL 1,000 mL Intravenous [address] Berta Davies RN     07/17/2020 9990 potassium chloride 20 mEq IVPB (premix) 20 mEq Intravenous Not Given Princess Colorado RN     07/17/2020 0850 potassium chloride 20 mEq IVPB (premix) 20 mEq Intravenous New [address] Amaris Suárez            Lab, Imaging and other studies: I have personally reviewed pertinent reports      VTE Pharmacologic Prophylaxis: Enoxaparin (Lovenox)  VTE Mechanical Prophylaxis: sequential compression device     Kelley Melendrez MD  7/17/2020,10:23 AM

## 2020-07-17 NOTE — ASSESSMENT & PLAN NOTE
Likely secondary to alcohol use  Hold hepatotoxic agents  LFTs improving, hepatitis panel negative  Resolved

## 2020-07-18 ENCOUNTER — APPOINTMENT (INPATIENT)
Dept: RADIOLOGY | Facility: HOSPITAL | Age: 55
DRG: 389 | End: 2020-07-18
Payer: COMMERCIAL

## 2020-07-18 ENCOUNTER — HOSPITAL ENCOUNTER (INPATIENT)
Facility: HOSPITAL | Age: 55
LOS: 4 days | Discharge: HOME | DRG: 336 | End: 2020-07-22
Attending: INTERNAL MEDICINE | Admitting: INTERNAL MEDICINE
Payer: COMMERCIAL

## 2020-07-18 VITALS
HEIGHT: 71 IN | HEART RATE: 50 BPM | RESPIRATION RATE: 18 BRPM | TEMPERATURE: 98.2 F | BODY MASS INDEX: 23.73 KG/M2 | WEIGHT: 169.53 LBS | SYSTOLIC BLOOD PRESSURE: 156 MMHG | OXYGEN SATURATION: 91 % | DIASTOLIC BLOOD PRESSURE: 96 MMHG

## 2020-07-18 DIAGNOSIS — K56.609 SMALL BOWEL OBSTRUCTION (HCC): Primary | ICD-10-CM

## 2020-07-18 PROBLEM — E87.6 HYPOKALEMIA: Status: ACTIVE | Noted: 2020-07-18

## 2020-07-18 LAB
ALBUMIN SERPL BCP-MCNC: 3 G/DL (ref 3.5–5)
ALP SERPL-CCNC: 37 U/L (ref 46–116)
ALT SERPL W P-5'-P-CCNC: 47 U/L (ref 12–78)
ANION GAP SERPL CALCULATED.3IONS-SCNC: 9 MMOL/L (ref 4–13)
AST SERPL W P-5'-P-CCNC: 16 U/L (ref 5–45)
BASOPHILS # BLD MANUAL: 0 THOUSAND/UL (ref 0–0.1)
BASOPHILS NFR MAR MANUAL: 0 % (ref 0–1)
BILIRUB SERPL-MCNC: 0.7 MG/DL (ref 0.2–1)
BUN SERPL-MCNC: 33 MG/DL (ref 5–25)
CALCIUM SERPL-MCNC: 8.7 MG/DL (ref 8.3–10.1)
CHLORIDE SERPL-SCNC: 99 MMOL/L (ref 100–108)
CO2 SERPL-SCNC: 33 MMOL/L (ref 21–32)
CREAT SERPL-MCNC: 0.88 MG/DL (ref 0.6–1.3)
EOSINOPHIL # BLD MANUAL: 0 THOUSAND/UL (ref 0–0.4)
EOSINOPHIL NFR BLD MANUAL: 0 % (ref 0–6)
ERYTHROCYTE [DISTWIDTH] IN BLOOD BY AUTOMATED COUNT: 12 % (ref 11.6–15.1)
GFR SERPL CREATININE-BSD FRML MDRD: 97 ML/MIN/1.73SQ M
GLUCOSE SERPL-MCNC: 67 MG/DL (ref 65–140)
HCT VFR BLD AUTO: 41.8 % (ref 36.5–49.3)
HGB BLD-MCNC: 14 G/DL (ref 12–17)
LYMPHOCYTES # BLD AUTO: 1.04 THOUSAND/UL (ref 0.6–4.47)
LYMPHOCYTES # BLD AUTO: 19 % (ref 14–44)
MCH RBC QN AUTO: 33 PG (ref 26.8–34.3)
MCHC RBC AUTO-ENTMCNC: 33.5 G/DL (ref 31.4–37.4)
MCV RBC AUTO: 99 FL (ref 82–98)
MONOCYTES # BLD AUTO: 0.87 THOUSAND/UL (ref 0–1.22)
MONOCYTES NFR BLD: 16 % (ref 4–12)
NEUTROPHILS # BLD MANUAL: 3.38 THOUSAND/UL (ref 1.85–7.62)
NEUTS BAND NFR BLD MANUAL: 1 % (ref 0–8)
NEUTS SEG NFR BLD AUTO: 61 % (ref 43–75)
NRBC BLD AUTO-RTO: 0 /100 WBCS
PLATELET # BLD AUTO: 264 THOUSANDS/UL (ref 149–390)
PLATELET BLD QL SMEAR: ADEQUATE
PMV BLD AUTO: 9.4 FL (ref 8.9–12.7)
POTASSIUM SERPL-SCNC: 3.4 MMOL/L (ref 3.5–5.3)
PROCALCITONIN SERPL-MCNC: 0.2 NG/ML
PROT SERPL-MCNC: 6.5 G/DL (ref 6.4–8.2)
RBC # BLD AUTO: 4.24 MILLION/UL (ref 3.88–5.62)
SODIUM SERPL-SCNC: 141 MMOL/L (ref 136–145)
TOTAL CELLS COUNTED SPEC: 100
VARIANT LYMPHS # BLD AUTO: 3 %
WBC # BLD AUTO: 5.45 THOUSAND/UL (ref 4.31–10.16)

## 2020-07-18 PROCEDURE — 84145 PROCALCITONIN (PCT): CPT | Performed by: PHYSICIAN ASSISTANT

## 2020-07-18 PROCEDURE — 85007 BL SMEAR W/DIFF WBC COUNT: CPT | Performed by: FAMILY MEDICINE

## 2020-07-18 PROCEDURE — 74022 RADEX COMPL AQT ABD SERIES: CPT

## 2020-07-18 PROCEDURE — 80053 COMPREHEN METABOLIC PANEL: CPT | Performed by: FAMILY MEDICINE

## 2020-07-18 PROCEDURE — 85027 COMPLETE CBC AUTOMATED: CPT | Performed by: FAMILY MEDICINE

## 2020-07-18 PROCEDURE — 99223 1ST HOSP IP/OBS HIGH 75: CPT | Performed by: INTERNAL MEDICINE

## 2020-07-18 PROCEDURE — 99232 SBSQ HOSP IP/OBS MODERATE 35: CPT | Performed by: FAMILY MEDICINE

## 2020-07-18 RX ORDER — SODIUM CHLORIDE 9 MG/ML
75 INJECTION, SOLUTION INTRAVENOUS CONTINUOUS
Status: DISCONTINUED | OUTPATIENT
Start: 2020-07-18 | End: 2020-07-22

## 2020-07-18 RX ORDER — LABETALOL 20 MG/4 ML (5 MG/ML) INTRAVENOUS SYRINGE
10 EVERY 4 HOURS PRN
Status: DISCONTINUED | OUTPATIENT
Start: 2020-07-18 | End: 2020-07-22 | Stop reason: HOSPADM

## 2020-07-18 RX ORDER — POTASSIUM CHLORIDE 14.9 MG/ML
20 INJECTION INTRAVENOUS ONCE
Status: COMPLETED | OUTPATIENT
Start: 2020-07-18 | End: 2020-07-18

## 2020-07-18 RX ORDER — SODIUM CHLORIDE 9 MG/ML
100 INJECTION, SOLUTION INTRAVENOUS CONTINUOUS
Status: CANCELLED | OUTPATIENT
Start: 2020-07-18

## 2020-07-18 RX ORDER — MORPHINE SULFATE 4 MG/ML
4 INJECTION, SOLUTION INTRAMUSCULAR; INTRAVENOUS EVERY 4 HOURS PRN
Status: CANCELLED | OUTPATIENT
Start: 2020-07-18

## 2020-07-18 RX ORDER — LABETALOL 20 MG/4 ML (5 MG/ML) INTRAVENOUS SYRINGE
10 EVERY 4 HOURS PRN
Status: CANCELLED | OUTPATIENT
Start: 2020-07-18

## 2020-07-18 RX ORDER — NICOTINE 21 MG/24HR
1 PATCH, TRANSDERMAL 24 HOURS TRANSDERMAL DAILY
Status: CANCELLED | OUTPATIENT
Start: 2020-07-19

## 2020-07-18 RX ORDER — ONDANSETRON 2 MG/ML
4 INJECTION INTRAMUSCULAR; INTRAVENOUS EVERY 4 HOURS PRN
Status: DISCONTINUED | OUTPATIENT
Start: 2020-07-18 | End: 2020-07-22 | Stop reason: HOSPADM

## 2020-07-18 RX ORDER — MORPHINE SULFATE 4 MG/ML
4 INJECTION, SOLUTION INTRAMUSCULAR; INTRAVENOUS EVERY 4 HOURS PRN
Status: DISCONTINUED | OUTPATIENT
Start: 2020-07-18 | End: 2020-07-22

## 2020-07-18 RX ORDER — CEFAZOLIN SODIUM 2 G/50ML
2000 SOLUTION INTRAVENOUS ONCE
Status: CANCELLED | OUTPATIENT
Start: 2020-07-18 | End: 2020-07-18

## 2020-07-18 RX ORDER — ONDANSETRON 2 MG/ML
4 INJECTION INTRAMUSCULAR; INTRAVENOUS EVERY 4 HOURS PRN
Status: CANCELLED | OUTPATIENT
Start: 2020-07-18

## 2020-07-18 RX ORDER — NICOTINE 21 MG/24HR
1 PATCH, TRANSDERMAL 24 HOURS TRANSDERMAL DAILY
Status: DISCONTINUED | OUTPATIENT
Start: 2020-07-19 | End: 2020-07-22 | Stop reason: HOSPADM

## 2020-07-18 RX ADMIN — SODIUM CHLORIDE 100 ML/HR: 0.9 INJECTION, SOLUTION INTRAVENOUS at 11:57

## 2020-07-18 RX ADMIN — SODIUM CHLORIDE 100 ML/HR: 0.9 INJECTION, SOLUTION INTRAVENOUS at 15:07

## 2020-07-18 RX ADMIN — POTASSIUM CHLORIDE 20 MEQ: 14.9 INJECTION, SOLUTION INTRAVENOUS at 15:07

## 2020-07-18 RX ADMIN — ENOXAPARIN SODIUM 40 MG: 40 INJECTION SUBCUTANEOUS at 08:07

## 2020-07-18 RX ADMIN — FOLIC ACID: 5 INJECTION, SOLUTION INTRAMUSCULAR; INTRAVENOUS; SUBCUTANEOUS at 08:06

## 2020-07-18 NOTE — ASSESSMENT & PLAN NOTE
Drinks 6-8 beers a day  Patient placed on CIWA protocol  Will initiate multivitamin once able to take p o

## 2020-07-18 NOTE — PROGRESS NOTES
Progress Note   Charlotte Weaver 53 y o  male MRN: [id number]  Unit/Bed#: 400-01 Encounter: [id number]      Subjective/Objective     Subjective:  No acute overnight events  Patient denies any nausea or vomiting  He states that he is passing gas but denies any bowel movements  He denies any pain  He has been ambulating in the halls  His NG tube remains in place with over 2 L recorded output  Objective:    /86   Pulse 92   Temp 98 3 °F (36 8 °C)   Resp 18   Ht 5' 11" (1 803 m)   Wt 76 9 kg (169 lb 8 5 oz)   SpO2 91%   BMI 23 65 kg/m²       Intake/Output Summary (Last 24 hours) at 7/18/2020 1127  Last data filed at 7/18/2020 1022  Gross per 24 hour   Intake 2328 34 ml   Output 6575 ml   Net -4246 66 ml       Invasive Devices     Peripheral Intravenous Line            Peripheral IV 07/15/20 Left Antecubital 3 days          Drain            NG/OG/Enteral Tube Nasogastric 18 Fr Left nares 3 days                    Physical Exam    General Appearance:    Alert, cooperative, no distress, appears stated age   Head:    Normocephalic, without obvious abnormality, atraumatic   Lungs:     Respirations unlabored   Heart:    Regular rate and rhythm   Abdomen:     Soft, distended, nontender  NG tube in place to suction with bilious output  Extremities:   Extremities normal, atraumatic, no cyanosis or edema   Neurologic:  Psych:   Normal strength and sensation    Normal mood and affect       Lab, Imaging and other studies:  I have personally reviewed pertinent lab results    , CBC:   Lab Results   Component Value Date    WBC 5 45 07/18/2020    HGB 14 0 07/18/2020    HCT 41 8 07/18/2020    MCV 99 (H) 07/18/2020     07/18/2020    MCH 33 0 07/18/2020    MCHC 33 5 07/18/2020    RDW 12 0 07/18/2020    MPV 9 4 07/18/2020    NRBC 0 07/18/2020   , CMP:   Lab Results   Component Value Date    SODIUM 141 07/18/2020    K 3 4 (L) 07/18/2020    CL 99 (L) 07/18/2020    CO2 33 (H) 07/18/2020    BUN 33 (H) 07/18/2020 CREATININE 0 88 07/18/2020    CALCIUM 8 7 07/18/2020    AST 16 07/18/2020    ALT 47 07/18/2020    ALKPHOS 37 (L) 07/18/2020    EGFR 97 07/18/2020          Assessment/Plan  50-year-old male with no history of previous surgeries presenting with high-grade small bowel obstruction  Patient has been managed non operatively to date  He reports passing flatus, however, he continues to have high output out of his NG tube and remains distended on exam     - Will check repeat obstructive series to check for persistence of SBO as well as placement of NG tube  -continue NG tube to low continuous suction  - continue NPO/IVF  - DVT ppx  - encourage deep breathing, IS  - prn pain and antiemetic regimen  - CEA normal  - discussion was had with patient regarding persistence of his small-bowel obstruction and the need for operative intervention if is not resolving  Will follow up the obstructive series, and if patient is not significantly improving, will transfer to the heightened for diagnostic laparoscopy, possible exploratory laparotomy tomorrow

## 2020-07-18 NOTE — ASSESSMENT & PLAN NOTE
· Patient was transferred from [facility]  to our [facility] for surgical intervention by General Surgical Service  · Conservative management was attempted however patient has continued to have significant output through NG tube  · Will continue NG tube  · NPO  · Pain control as needed  · Surgical consult  · Supportive care  · Tentative plan for OR tomorrow

## 2020-07-18 NOTE — SOCIAL WORK
Pt is being transferred to [address]  As per physician pt needs surgery tomorrow  Pt is being transferred to the [facility] Kh[facility]  I called [phone] [facility] ([phone])  to notify that patient is being transferred and will need ambulance authorization  Currently there is no one present at [facility]  so I am unable to get auth

## 2020-07-18 NOTE — H&P
H&P- Kassy Reasons 1965, 53 y o  male MRN: [id number]    Unit/Bed#: -01 Encounter: [id number]    Primary Care Provider: Isacc Tillman MD   Date and time admitted to hospital: 7/18/2020  2:07 PM        * Small bowel obstruction [facility])  Assessment & Plan  · Patient was transferred from [facility] to [facility] for surgical intervention by General Surgical Service  · Conservative management was attempted however patient has continued to have significant output through NG tube  · Will continue NG tube  · NPO  · Pain control as needed  · Surgical consult  · Supportive care  · Tentative plan for OR tomorrow    Hypokalemia  Assessment & Plan  · Replaced, will monitor    Acute renal failure (ARF) ([address] )  Assessment & Plan  · Improved with IV fluids    Tobacco use  Assessment & Plan  · Counseled on smoking cessation  · Patient refusing nicotine patch    Alcohol abuse  Assessment & Plan  · No signs of withdrawal at this time  · Will continue to monitor    VTE Prophylaxis: Enoxaparin (Lovenox)  Code Status:  Full code  POLST: There is no POLST form on file for this patient (pre-hospital)  Discussion with family:  Spoke to the significant other at bedside regarding plan of care    Anticipated Length of Stay:  Patient will be admitted on an Inpatient basis with an anticipated length of stay of  > 2 midnights  Justification for Hospital Stay:  Small-bowel obstruction    Chief Complaint:   Nausea/vomiting    History of Present Illness:    Kassy Reasons is a 53 y o  male who presents with nausea/vomiting  Patient was transferred from [address] [place] [address],[address] as there OR was close this weekend and patient needs surgical intervention per surgical team   Patient has findings of small-bowel obstruction on imaging studies  Patient with NG tube in place with significant output  Clinically appears to still have obstruction based on exam findings  At this time patient denies any nausea or vomiting    No significant abdominal pain at this time  No fever or chills  Review of Systems:  Review of Systems   Gastrointestinal: Positive for abdominal distention, abdominal pain, nausea and vomiting  Negative for blood in stool and diarrhea  All other systems reviewed and are negative  Past Medical and Surgical History:   No past medical history on file  Past Surgical History:   Procedure Laterality Date    KNEE ARTHROSCOPY         Meds/Allergies:  Prior to Admission medications    Not on File     I have reviewed home medications with patient personally  Allergies: No Known Allergies    Social History:  Marital Status: /Civil Union   Occupation:  Works as an advisor  Patient Pre-hospital Living Situation:  Lives with wife  Patient Pre-hospital Level of Mobility:  Ambulatory  Patient Pre-hospital Diet Restrictions:  None  Substance Use History:     Social History     Substance and Sexual Activity   Alcohol Use Yes    Alcohol/week: 42 0 standard drinks    Types: 42 Cans of beer per week    Frequency: 4 or more times a week    Drinks per session: 5 or 6    Binge frequency: Daily or almost daily     Social History     Tobacco Use   Smoking Status Current Every Day Smoker    Packs/day: 2 00    Years: 40 00    Pack years: 80 00    Types: Cigarettes   Smokeless Tobacco Never Used     Social History     Substance and Sexual Activity   Drug Use Not Currently       Family History:  I have reviewed the patients family history    Physical Exam:   Vitals:   Blood Pressure: 156/96 (07/18/20 1410)  Pulse: (!) 50 (07/18/20 1410)  Temperature: 98 8 °F (37 1 °C) (07/18/20 1410)  Temp Source: Temporal (07/18/20 1410)  Respirations: 16 (07/18/20 1410)  Weight - Scale: 75 8 kg (167 lb) (07/18/20 1410)  SpO2: 95 % (07/18/20 1410)    Physical Exam   Constitutional: He appears well-developed  No distress  HENT:   Head: Normocephalic and atraumatic     NG tube in place   Eyes: Conjunctivae and EOM are normal    Neck: Normal range of motion  Neck supple  Cardiovascular: Normal rate and regular rhythm  Pulmonary/Chest: Effort normal  No respiratory distress  Abdominal: Soft  He exhibits distension  There is no tenderness  Musculoskeletal: Normal range of motion  He exhibits no edema  Neurological: He is alert  No cranial nerve deficit  Skin: Skin is warm and dry  Psychiatric: He has a normal mood and affect  His behavior is normal        Additional Data:   Lab Results: I have personally reviewed pertinent reports  Results from last 7 days   Lab Units 07/18/20  0513  07/15/20  0805   WBC Thousand/uL 5 45   < > 6 24   RBC Million/uL 4 24   < > 5 20   HEMOGLOBIN g/dL 14 0   < > 17 3*   HEMATOCRIT % 41 8   < > 49 7*   PLATELETS Thousands/uL 264   < > 278   NEUTROS PCT %  --   --  81*   LYMPHS PCT %  --   --  8*   LYMPHO PCT % 19   < >  --    MONOS PCT %  --   --  10   MONO PCT % 16*   < >  --    EOS PCT % 0   < > 0    < > = values in this interval not displayed  Results from last 7 days   Lab Units 07/18/20  0513   SODIUM mmol/L 141   POTASSIUM mmol/L 3 4*   CHLORIDE mmol/L 99*   CO2 mmol/L 33*   BUN mg/dL 33*   CREATININE mg/dL 0 88   CALCIUM mg/dL 8 7   ALK PHOS U/L 37*   ALT U/L 47   AST U/L 16     Results from last 7 days   Lab Units 07/15/20  0805   INR  0 91                 Imaging: I have personally reviewed pertinent reports  No orders to display       Epic Records Reviewed: Yes     ** Please Note: This note has been constructed using a voice recognition system   **

## 2020-07-18 NOTE — PLAN OF CARE
Problem: PAIN - ADULT  Goal: Verbalizes/displays adequate comfort level or baseline comfort level  Description  Interventions:  - Encourage patient to monitor pain and request assistance  - Assess pain using appropriate pain scale  - Administer analgesics based on type and severity of pain and evaluate response  - Implement non-pharmacological measures as appropriate and evaluate response  - Consider cultural and social influences on pain and pain management  - Notify physician/advanced practitioner if interventions unsuccessful or patient reports new pain  Outcome: Progressing     Problem: INFECTION - ADULT  Goal: Absence or prevention of progression during hospitalization  Description  INTERVENTIONS:  - Assess and monitor for signs and symptoms of infection  - Monitor lab/diagnostic results  - Monitor all insertion sites, i e  indwelling lines, tubes, and drains  - [place] appropriate cooling/warming therapies per order  - Administer medications as ordered  - Instruct and encourage patient and family to use good hand hygiene technique  - Identify and instruct in appropriate isolation precautions for identified infection/condition   Outcome: Progressing     Problem: SAFETY ADULT  Goal: Patient will remain free of falls  Description  INTERVENTIONS:  - Assess patient frequently for physical needs  -  Identify cognitive and physical deficits and behaviors that affect risk of falls    -  [place] fall precautions as indicated by assessment   - Educate patient/family on patient safety including physical limitations  - Instruct patient to call for assistance with activity based on assessment  - Modify environment to reduce risk of injury  - Consider OT/PT consult to assist with strengthening/mobility  Outcome: Progressing  Goal: Maintain or return to baseline ADL function  Description  INTERVENTIONS:  -  Assess patient's ability to carry out ADLs; assess patient's baseline for ADL function and identify physical deficits which impact ability to perform ADLs (bathing, care of mouth/teeth, toileting, grooming, dressing, etc )  - Assess/evaluate cause of self-care deficits   - Assess range of motion  - Assess patient's mobility; develop plan if impaired  - Assess patient's need for assistive devices and provide as appropriate  - Encourage maximum independence but intervene and supervise when necessary  - Involve family in performance of ADLs  - Assess for home care needs following discharge   - Consider OT consult to assist with ADL evaluation and planning for discharge  - Provide patient education as appropriate  Outcome: Progressing  Goal: Maintain or return mobility status to optimal level  Description  INTERVENTIONS:  - Assess patient's baseline mobility status (ambulation, transfers, stairs, etc )    - Identify cognitive and physical deficits and behaviors that affect mobility  - Identify mobility aids required to assist with transfers and/or ambulation (gait belt, sit-to-stand, lift, walker, cane, etc )  - [place] fall precautions as indicated by assessment  - Record patient progress and toleration of activity level on Mobility SBAR; progress patient to next Phase/Stage  - Instruct patient to call for assistance with activity based on assessment  - Consider rehabilitation consult to assist with strengthening/weightbearing, etc   Outcome: Progressing     Problem: DISCHARGE PLANNING  Goal: Discharge to home or other facility with appropriate resources  Description  INTERVENTIONS:  - Identify barriers to discharge w/patient and caregiver  - Arrange for needed discharge resources and transportation as appropriate  - Identify discharge learning needs (meds, wound care, etc )  - Arrange for interpretive services to assist at discharge as needed  - Refer to Case Management Department for coordinating discharge planning if the patient needs post-hospital services based on physician/advanced practitioner order or complex needs related to functional status, cognitive ability, or social support system  Outcome: Progressing     Problem: Knowledge Deficit  Goal: Patient/family/caregiver demonstrates understanding of disease process, treatment plan, medications, and discharge instructions  Description  Complete learning assessment and assess knowledge base  Interventions:  - Provide teaching at level of understanding  - Provide teaching via preferred learning methods  Outcome: Progressing     Problem: Potential for Falls  Goal: Patient will remain free of falls  Description  INTERVENTIONS:  - Assess patient frequently for physical needs  -  Identify cognitive and physical deficits and behaviors that affect risk of falls    -  [place] fall precautions as indicated by assessment   - Educate patient/family on patient safety including physical limitations  - Instruct patient to call for assistance with activity based on assessment  - Modify environment to reduce risk of injury  - Consider OT/PT consult to assist with strengthening/mobility  Outcome: Progressing     Problem: GASTROINTESTINAL - ADULT  Goal: Minimal or absence of nausea and/or vomiting  Description  INTERVENTIONS:  - Administer IV fluids if ordered to ensure adequate hydration  - Maintain NPO status until nausea and vomiting are resolved  - Nasogastric tube if ordered  - Administer ordered antiemetic medications as needed  - Provide nonpharmacologic comfort measures as appropriate  - Advance diet as tolerated, if ordered     Outcome: Progressing  Goal: Maintains or returns to baseline bowel function  Description  INTERVENTIONS:  - Assess bowel function  - Encourage oral fluids to ensure adequate hydration (NPO/ice chips)  - Administer IV fluids if ordered to ensure adequate hydration  - Administer ordered medications as needed  - Encourage mobilization and activity     Outcome: Progressing

## 2020-07-18 NOTE — PLAN OF CARE
Problem: Potential for Falls  Goal: Patient will remain free of falls  Description  INTERVENTIONS:  - Assess patient frequently for physical needs  -  Identify cognitive and physical deficits and behaviors that affect risk of falls    -  [place] fall precautions as indicated by assessment   - Educate patient/family on patient safety including physical limitations  - Instruct patient to call for assistance with activity based on assessment  - Modify environment to reduce risk of injury  - Consider OT/PT consult to assist with strengthening/mobility  Outcome: Progressing     Problem: GASTROINTESTINAL - ADULT  Goal: Minimal or absence of nausea and/or vomiting  Description  INTERVENTIONS:  - Administer IV fluids if ordered to ensure adequate hydration  - Maintain NPO status until nausea and vomiting are resolved  - Nasogastric tube if ordered  - Administer ordered antiemetic medications as needed  - Provide nonpharmacologic comfort measures as appropriate  - Advance diet as tolerated, if ordered  - Consider nutrition services referral to assist patient with adequate nutrition and appropriate food choices  Outcome: Progressing  Goal: Maintains or returns to baseline bowel function  Description  INTERVENTIONS:  - Assess bowel function  - Encourage oral fluids to ensure adequate hydration  - Administer IV fluids if ordered to ensure adequate hydration  - Administer ordered medications as needed  - Encourage mobilization and activity  - Consider nutritional services referral to assist patient with adequate nutrition and appropriate food choices  Outcome: Progressing  Goal: Maintains adequate nutritional intake  Description  INTERVENTIONS:  - Monitor percentage of each meal consumed  - Identify factors contributing to decreased intake, treat as appropriate  - Assist with meals as needed  - Monitor I&O, weight, and lab values if indicated  - Obtain nutrition services referral as needed  Outcome: Progressing     Problem: METABOLIC, FLUID AND ELECTROLYTES - ADULT  Goal: Electrolytes maintained within normal limits  Description  INTERVENTIONS:  - Monitor labs and assess patient for signs and symptoms of electrolyte imbalances  - Administer electrolyte replacement as ordered  - Monitor response to electrolyte replacements, including repeat lab results as appropriate  - Instruct patient on fluid and nutrition as appropriate  Outcome: Progressing  Goal: Fluid balance maintained  Description  INTERVENTIONS:  - Monitor labs   - Monitor I/O and WT  - Instruct patient on fluid and nutrition as appropriate  - Assess for signs & symptoms of volume excess or deficit  Outcome: Progressing     Problem: SKIN/TISSUE INTEGRITY - ADULT  Goal: Skin integrity remains intact  Description  INTERVENTIONS  - Identify patients at risk for skin breakdown  - Assess and monitor skin integrity  - Assess and monitor nutrition and hydration status  - Monitor labs (i e  albumin)  - Assess for incontinence   - Turn and reposition patient  - Assist with mobility/ambulation  - Relieve pressure over bony prominences  - Avoid friction and shearing  - Provide appropriate hygiene as needed including keeping skin clean and dry  - Evaluate need for skin moisturizer/barrier cream  - Collaborate with interdisciplinary team (i e  Nutrition, Rehabilitation, etc )   - Patient/family teaching  Outcome: Progressing  Goal: Oral mucous membranes remain intact  Description  INTERVENTIONS  - Assess oral mucosa and hygiene practices  - Implement preventative oral hygiene regimen  - Implement oral medicated treatments as ordered  - Initiate Nutrition services referral as needed  Outcome: Progressing     Problem: MUSCULOSKELETAL - ADULT  Goal: Maintain or return mobility to safest level of function  Description  INTERVENTIONS:  - Assess patient's ability to carry out ADLs; assess patient's baseline for ADL function and identify physical deficits which impact ability to perform ADLs (bathing, care of mouth/teeth, toileting, grooming, dressing, etc )  - Assess/evaluate cause of self-care deficits   - Assess range of motion  - Assess patient's mobility  - Assess patient's need for assistive devices and provide as appropriate  - Encourage maximum independence but intervene and supervise when necessary  - Involve family in performance of ADLs  - Assess for home care needs following discharge   - Consider OT consult to assist with ADL evaluation and planning for discharge  - Provide patient education as appropriate  Outcome: Progressing     Problem: PAIN - ADULT  Goal: Verbalizes/displays adequate comfort level or baseline comfort level  Description  Interventions:  - Encourage patient to monitor pain and request assistance  - Assess pain using appropriate pain scale  - Administer analgesics based on type and severity of pain and evaluate response  - Implement non-pharmacological measures as appropriate and evaluate response  - Consider cultural and social influences on pain and pain management  - Notify physician/advanced practitioner if interventions unsuccessful or patient reports new pain  Outcome: Progressing     Problem: INFECTION - ADULT  Goal: Absence or prevention of progression during hospitalization  Description  INTERVENTIONS:  - Assess and monitor for signs and symptoms of infection  - Monitor lab/diagnostic results  - Monitor all insertion sites, i e  indwelling lines, tubes, and drains  - Monitor endotracheal if appropriate and nasal secretions for changes in amount and color  - [place] appropriate cooling/warming therapies per order  - Administer medications as ordered  - Instruct and encourage patient and family to use good hand hygiene technique  - Identify and instruct in appropriate isolation precautions for identified infection/condition  Outcome: Progressing  Goal: Absence of fever/infection during neutropenic period  Description  INTERVENTIONS:  - Monitor WBC    Outcome: Progressing     Problem: SAFETY ADULT  Goal: Maintain or return to baseline ADL function  Description  INTERVENTIONS:  -  Assess patient's ability to carry out ADLs; assess patient's baseline for ADL function and identify physical deficits which impact ability to perform ADLs (bathing, care of mouth/teeth, toileting, grooming, dressing, etc )  - Assess/evaluate cause of self-care deficits   - Assess range of motion  - Assess patient's mobility; develop plan if impaired  - Assess patient's need for assistive devices and provide as appropriate  - Encourage maximum independence but intervene and supervise when necessary  - Involve family in performance of ADLs  - Assess for home care needs following discharge   - Consider OT consult to assist with ADL evaluation and planning for discharge  - Provide patient education as appropriate  Outcome: Progressing  Goal: Maintain or return mobility status to optimal level  Description  INTERVENTIONS:  - Assess patient's baseline mobility status (ambulation, transfers, stairs, etc )    - Identify cognitive and physical deficits and behaviors that affect mobility  - Identify mobility aids required to assist with transfers and/or ambulation (gait belt, sit-to-stand, lift, walker, cane, etc )  - [place] fall precautions as indicated by assessment  - Record patient progress and toleration of activity level on Mobility SBAR; progress patient to next Phase/Stage  - Instruct patient to call for assistance with activity based on assessment  - Consider rehabilitation consult to assist with strengthening/weightbearing, etc   Outcome: Progressing     Problem: DISCHARGE PLANNING  Goal: Discharge to home or other facility with appropriate resources  Description  INTERVENTIONS:  - Identify barriers to discharge w/patient and caregiver  - Arrange for needed discharge resources and transportation as appropriate  - Identify discharge learning needs (meds, wound care, etc )  - Arrange for interpretive services to assist at discharge as needed  - Refer to Case Management Department for coordinating discharge planning if the patient needs post-hospital services based on physician/advanced practitioner order or complex needs related to functional status, cognitive ability, or social support system  Outcome: Progressing     Problem: Knowledge Deficit  Goal: Patient/family/caregiver demonstrates understanding of disease process, treatment plan, medications, and discharge instructions  Description  Complete learning assessment and assess knowledge base    Interventions:  - Provide teaching at level of understanding  - Provide teaching via preferred learning methods  Outcome: Progressing

## 2020-07-18 NOTE — ASSESSMENT & PLAN NOTE
Likely secondary to prerenal azotemia in the setting of decreased p o   Intake  Check urine sodium and urine creatinine  No sign of obstructive uropathy on CT scan  Resolved, Decrease IV fluid rate 07/18/2020

## 2020-07-18 NOTE — ASSESSMENT & PLAN NOTE
Surgical input appreciated  NG to low intermittent suction  Supportive care in the form of IV fluids, antiemetics and pain control  Patient continues to pass some flatus, defer the decision to surgical team to advanced diet and discontinue NG tube   07/18/2020

## 2020-07-18 NOTE — PROGRESS NOTES
Progress Note - Jorgito Foss 1965, 53 y o  male MRN: [id number]    Unit/Bed#: 400-01 Encounter: [id number]    Primary Care Provider: Shelbi Durán MD   Date and time admitted to hospital: 7/15/2020  7:44 AM        * Small bowel obstruction [facility])  Assessment & Plan  Surgical input appreciated  NG to low intermittent suction  Supportive care in the form of IV fluids, antiemetics and pain control  Patient continues to pass some flatus, defer the decision to surgical team to advanced diet and discontinue NG tube   07/18/2020    Hyponatremia  Assessment & Plan  Likely hypovolemic hyponatremia in the setting of decreased p o  Intake  Resolved    Elevated LFTs  Assessment & Plan  Likely secondary to alcohol use  Hold hepatotoxic agents  LFTs improving, hepatitis panel negative  Resolved    Acute renal failure (ARF) (HCC)  Assessment & Plan  Likely secondary to prerenal azotemia in the setting of decreased p o  Intake  Check urine sodium and urine creatinine  No sign of obstructive uropathy on CT scan  Resolved, Decrease IV fluid rate 07/18/2020    Alcohol abuse  Assessment & Plan  Drinks 6-8 beers a day  Patient placed on CIWA protocol  Will initiate multivitamin once able to take p o  Progress Note - Jorgito Foss 53 y o  male MRN: [id number]    Unit/Bed#: 400-01 Encounter: [id number]        Subjective:   Patient seen examined at bedside, wishes to trial some liquids  Passing some gas, no bowel movement, abdominal distension is improving    Objective:     Vitals:   Vitals:    07/18/20 0702   BP: 143/86   Pulse: 92   Resp: 18   Temp: 98 3 °F (36 8 °C)   SpO2: 91%     Body mass index is 23 65 kg/m²      Intake/Output Summary (Last 24 hours) at 7/18/2020 0951  Last data filed at 7/18/2020 2241  Gross per 24 hour   Intake 3013 76 ml   Output 6075 ml   Net -3061 24 ml       Physical Exam:   /86   Pulse 92   Temp 98 3 °F (36 8 °C)   Resp 18   Ht 5' 11" (1 803 m)   Wt 76 9 kg (169 lb 8 5 oz)   SpO2 91%   BMI 23 65 kg/m²   General appearance: alert and oriented, in no acute distress  Head: Normocephalic, without obvious abnormality, atraumatic  Lungs: clear to auscultation bilaterally  Heart: regular rate and rhythm, S1, S2 normal, no murmur, click, rub or gallop  Abdomen:  Persistent hypoactive bowel sounds, decreasing distension, no rebound or guarding or tenderness  Extremities: extremities normal, warm and well-perfused; no cyanosis, clubbing, or edema  Pulses: 2+ and symmetric  Neurologic: Grossly normal     Invasive Devices     Peripheral Intravenous Line            Peripheral IV 07/15/20 Left Antecubital 3 days          Drain            NG/OG/Enteral Tube Nasogastric 18 Fr Left nares 2 days                Results from last 7 days   Lab Units 07/18/20  0513 07/17/20  0434 07/16/20  0433   WBC Thousand/uL 5 45 3 18* 3 46*   HEMOGLOBIN g/dL 14 0 14 3 15 1   HEMATOCRIT % 41 8 42 9 44 5   PLATELETS Thousands/uL 264 248 257       Results from last 7 days   Lab Units 07/18/20 0513 07/17/20  0434 07/16/20  0433   POTASSIUM mmol/L 3 4* 3 2* 3 4*   CHLORIDE mmol/L 99* 100 94*   CO2 mmol/L 33* 33* 32   BUN mg/dL 33* 35* 44*   CREATININE mg/dL 0 88 1 12 1 30   CALCIUM mg/dL 8 7 8 5 8 5   ALK PHOS U/L 37* 40* 44*   ALT U/L 47 68 117*   AST U/L 16 19 39       Medication Administration - last 24 hours from 07/17/2020 0951 to 07/18/2020 5590       Date/Time Order Dose Route Action Action by     07/17/2020 2355 sodium chloride 0 9 % infusion 100 mL/hr Intravenous New Bag Lisa Alonso RN     07/17/2020 1209 sodium chloride 0 9 % infusion 100 mL/hr Intravenous New Bag Amaris Rakos     07/17/2020 1030 sodium chloride 0 9 % infusion 100 mL/hr Intravenous Rate/Dose Change Angeles Colin RN     91/38/3071 8951 folic acid 1 mg, thiamine (VITAMIN B1) 100 mg in sodium chloride 0 9 % 100 mL IV piggyback   Intravenous [facility], [address] [place] [address]     07/18/2020 0805 nicotine (NICODERM CQ) 21 mg/24 hr TD 24 hr patch 1 patch 1 patch Transdermal Not Given Елена Courtney RN     07/18/2020 5443 enoxaparin (LOVENOX) subcutaneous injection 40 mg 40 mg Subcutaneous Given Елена Courtney RN     07/17/2020 1412 potassium chloride 20 mEq IVPB (premix) 20 mEq Intravenous New [facility], [address] [place] [address]     07/17/2020 1214 potassium chloride 20 mEq IVPB (premix) 20 mEq Intravenous New Bag Amaris Rakos            Lab, Imaging and other studies: I have personally reviewed pertinent reports      VTE Pharmacologic Prophylaxis: Enoxaparin (Lovenox)  VTE Mechanical Prophylaxis: sequential compression device     Zuri Thibodeaux MD  7/18/2020,9:51 AM

## 2020-07-18 NOTE — DISCHARGE SUMMARY
Discharge Summary - Darren Nayak 53 y o  male MRN: [id number]    Unit/Bed#: 400-01 Encounter: [id number]    Admission Date:   Admission Orders (From admission, onward)     Ordered        07/15/20 1040  Inpatient Admission  Once                     Admitting Diagnosis: Dehydration [E86 0]  Vomiting [R11 10]  SBO (small bowel obstruction) ([facility]) [K56 609]  Nausea and vomiting [R11 2]  Transaminitis [R74 0]  TACO (acute kidney injury) ([facility] [address] ) [N17 9]    HPI: Patient is a pleasant 67-year-old male with no significant past medical history or surgical history, presents to the ED today with 3 day history of abdominal pain accompanied by nausea and vomiting  Patient developed abdominal pain is diffuse on Sunday evening  Patient felt that it was a stomach bug and was drinking liquids, last evening he had a hot dog in some crackers but continued to have emesis and presents to the ED today for evaluation  On further questioning patient states that his stool caliber has been thin for last 2 months  He denies any melena hematochezia  No unintentional weight loss  He smokes 2 packs a day and drinks 6-8 beers a day  Last drink was on Sunday  Procedures Performed: No orders of the defined types were placed in this encounter  Summary of Hospital Course:     1) Small-bowel obstruction    Patient presents to the emergency room with complaint of abdominal pain, nausea and vomiting  CT of the abdomen was requested and is discussed below  In the emergency room an NG tube was placed with approximately 2 L of output immediately  NG tube was continued for the next 48 hours, with significant output  Supportive care in the form of IV fluids, antiemetics and pain control was initiated  Patient was passing gas on 07/17/2020, very small amounts and no bowel movements    Patient was examined by surgery on date of transfer, given persistent voluminous output from the NG, a non improving gas pattern on obstruction series and only mild improvement in distension, it was felt patient may require exploratory laparotomy  Therefore patient will need transfer to our sister [place] in [facility] for operative intervention tomorrow  2) Alcohol use disorder  3) Elevated liver function testing    Patient stated his last drink was on 07/12/2020, he was placed on CIWA protocol, ultimately liver enzymes normalized, and hepatitis panel was negative  Tobacco use disorder:  Nicotine patch was provided    Significant Findings, Care, Treatment and Services Provided:     CT      High-grade small bowel obstruction with the transition point noted in the right midabdomen (image 66 of series 601)   Maximal small bowel transverse diameter measures approximately 4 5 cm   Marked dilatation of the stomach secondary to the bowel   obstruction   No pneumoperitoneum or ascites  Complications: none    Discharge Diagnosis: see above    Resolved Problems  Date Reviewed: 7/15/2020    None          Condition at Discharge: fair         Discharge instructions/Information to patient and family:   See after visit summary for information provided to patient and family  Provisions for Follow-Up Care:  See after visit summary for information related to follow-up care and any pertinent home health orders  PCP: Berkley Law MD    Disposition: [facility]    Planned Readmission: No          Discharge Statement   I spent 45 minutes discharging the patient  This time was spent on the day of discharge  I had direct contact with the patient on the day of discharge  Additional documentation is required if more than 30 minutes were spent on discharge  Discharge Medications:  See after visit summary for reconciled discharge medications provided to patient and family

## 2020-07-19 ENCOUNTER — APPOINTMENT (INPATIENT)
Dept: RADIOLOGY | Facility: HOSPITAL | Age: 55
DRG: 389 | End: 2020-07-19
Payer: COMMERCIAL

## 2020-07-19 PROBLEM — K56.609 SMALL BOWEL OBSTRUCTION (HCC): Status: RESOLVED | Noted: 2020-07-15 | Resolved: 2020-07-19

## 2020-07-19 LAB
ALBUMIN SERPL BCP-MCNC: 3.5 G/DL (ref 3.5–5.7)
ALP SERPL-CCNC: 26 U/L (ref 40–150)
ALT SERPL W P-5'-P-CCNC: 23 U/L (ref 7–52)
ANION GAP SERPL CALCULATED.3IONS-SCNC: 9 MMOL/L (ref 4–13)
AST SERPL W P-5'-P-CCNC: 13 U/L (ref 13–39)
BASOPHILS # BLD AUTO: 0 THOUSANDS/ΜL (ref 0–0.1)
BASOPHILS NFR BLD AUTO: 0 % (ref 0–2)
BILIRUB SERPL-MCNC: 0.6 MG/DL (ref 0.2–1)
BUN SERPL-MCNC: 32 MG/DL (ref 7–25)
CALCIUM SERPL-MCNC: 8.3 MG/DL (ref 8.6–10.5)
CHLORIDE SERPL-SCNC: 95 MMOL/L (ref 98–107)
CO2 SERPL-SCNC: 31 MMOL/L (ref 21–31)
CREAT SERPL-MCNC: 0.8 MG/DL (ref 0.7–1.3)
EOSINOPHIL # BLD AUTO: 0.1 THOUSAND/ΜL (ref 0–0.61)
EOSINOPHIL NFR BLD AUTO: 2 % (ref 0–5)
ERYTHROCYTE [DISTWIDTH] IN BLOOD BY AUTOMATED COUNT: 12.7 % (ref 11.5–14.5)
GFR SERPL CREATININE-BSD FRML MDRD: 101 ML/MIN/1.73SQ M
GLUCOSE SERPL-MCNC: 93 MG/DL (ref 65–99)
HCT VFR BLD AUTO: 40.8 % (ref 42–47)
HGB BLD-MCNC: 14 G/DL (ref 14–18)
LYMPHOCYTES # BLD AUTO: 0.9 THOUSANDS/ΜL (ref 0.6–4.47)
LYMPHOCYTES NFR BLD AUTO: 14 % (ref 21–51)
MCH RBC QN AUTO: 33.5 PG (ref 26–34)
MCHC RBC AUTO-ENTMCNC: 34.3 G/DL (ref 31–37)
MCV RBC AUTO: 98 FL (ref 81–99)
MONOCYTES # BLD AUTO: 1.2 THOUSAND/ΜL (ref 0.17–1.22)
MONOCYTES NFR BLD AUTO: 18 % (ref 2–12)
NEUTROPHILS # BLD AUTO: 4.2 THOUSANDS/ΜL (ref 1.4–6.5)
NEUTS SEG NFR BLD AUTO: 66 % (ref 42–75)
PLATELET # BLD AUTO: 299 THOUSANDS/UL (ref 149–390)
PMV BLD AUTO: 7.5 FL (ref 8.6–11.7)
POTASSIUM SERPL-SCNC: 3.4 MMOL/L (ref 3.5–5.5)
PROT SERPL-MCNC: 5.9 G/DL (ref 6.4–8.9)
RBC # BLD AUTO: 4.17 MILLION/UL (ref 4.3–5.9)
SODIUM SERPL-SCNC: 135 MMOL/L (ref 134–143)
WBC # BLD AUTO: 6.4 THOUSAND/UL (ref 4.8–10.8)

## 2020-07-19 PROCEDURE — 85025 COMPLETE CBC W/AUTO DIFF WBC: CPT | Performed by: INTERNAL MEDICINE

## 2020-07-19 PROCEDURE — 80053 COMPREHEN METABOLIC PANEL: CPT | Performed by: INTERNAL MEDICINE

## 2020-07-19 PROCEDURE — 74018 RADEX ABDOMEN 1 VIEW: CPT

## 2020-07-19 PROCEDURE — C9113 INJ PANTOPRAZOLE SODIUM, VIA: HCPCS | Performed by: SURGERY

## 2020-07-19 RX ORDER — MAGNESIUM HYDROXIDE/ALUMINUM HYDROXICE/SIMETHICONE 120; 1200; 1200 MG/30ML; MG/30ML; MG/30ML
30 SUSPENSION ORAL EVERY 4 HOURS PRN
Status: DISCONTINUED | OUTPATIENT
Start: 2020-07-19 | End: 2020-07-22 | Stop reason: HOSPADM

## 2020-07-19 RX ORDER — POTASSIUM CHLORIDE 20 MEQ/1
20 TABLET, EXTENDED RELEASE ORAL ONCE
Status: COMPLETED | OUTPATIENT
Start: 2020-07-19 | End: 2020-07-19

## 2020-07-19 RX ORDER — PANTOPRAZOLE SODIUM 40 MG/1
40 INJECTION, POWDER, FOR SOLUTION INTRAVENOUS
Status: DISCONTINUED | OUTPATIENT
Start: 2020-07-19 | End: 2020-07-22 | Stop reason: HOSPADM

## 2020-07-19 RX ADMIN — ENOXAPARIN SODIUM 40 MG: 40 INJECTION SUBCUTANEOUS at 08:42

## 2020-07-19 RX ADMIN — PANTOPRAZOLE SODIUM 40 MG: 40 INJECTION, POWDER, FOR SOLUTION INTRAVENOUS at 21:24

## 2020-07-19 RX ADMIN — POTASSIUM CHLORIDE 20 MEQ: 1500 TABLET, EXTENDED RELEASE ORAL at 10:32

## 2020-07-19 RX ADMIN — ONDANSETRON 4 MG: 2 INJECTION INTRAMUSCULAR; INTRAVENOUS at 13:43

## 2020-07-19 RX ADMIN — ALUMINUM HYDROXIDE, MAGNESIUM HYDROXIDE, AND SIMETHICONE 30 ML: 200; 200; 20 SUSPENSION ORAL at 13:12

## 2020-07-19 RX ADMIN — ONDANSETRON 4 MG: 2 INJECTION INTRAMUSCULAR; INTRAVENOUS at 19:33

## 2020-07-19 RX ADMIN — ALUMINUM HYDROXIDE, MAGNESIUM HYDROXIDE, AND SIMETHICONE 30 ML: 200; 200; 20 SUSPENSION ORAL at 18:30

## 2020-07-19 RX ADMIN — FOLIC ACID: 5 INJECTION, SOLUTION INTRAMUSCULAR; INTRAVENOUS; SUBCUTANEOUS at 08:42

## 2020-07-19 RX ADMIN — SODIUM CHLORIDE 100 ML/HR: 0.9 INJECTION, SOLUTION INTRAVENOUS at 01:50

## 2020-07-19 RX ADMIN — SODIUM CHLORIDE 75 ML/HR: 0.9 INJECTION, SOLUTION INTRAVENOUS at 12:58

## 2020-07-19 NOTE — QUICK NOTE
Spoke with nurse, patient with no appetite, unable to eat dinner  Denies nausea or abdominal pain  Continues to pass flatus   Will order obstruction series, NPO at midnight, and reevaluate patient in AM

## 2020-07-19 NOTE — PLAN OF CARE
Problem: Potential for Falls  Goal: Patient will remain free of falls  Description  INTERVENTIONS:  - Assess patient frequently for physical needs  -  Identify cognitive and physical deficits and behaviors that affect risk of falls    -  [place] fall precautions as indicated by assessment   - Educate patient/family on patient safety including physical limitations  - Instruct patient to call for assistance with activity based on assessment  - Modify environment to reduce risk of injury  - Consider OT/PT consult to assist with strengthening/mobility  Outcome: Progressing     Problem: GASTROINTESTINAL - ADULT  Goal: Minimal or absence of nausea and/or vomiting  Description  INTERVENTIONS:  - Administer IV fluids if ordered to ensure adequate hydration  - Maintain NPO status until nausea and vomiting are resolved  - Nasogastric tube if ordered  - Administer ordered antiemetic medications as needed  - Provide nonpharmacologic comfort measures as appropriate  - Advance diet as tolerated, if ordered  - Consider nutrition services referral to assist patient with adequate nutrition and appropriate food choices  Outcome: Progressing  Goal: Maintains or returns to baseline bowel function  Description  INTERVENTIONS:  - Assess bowel function  - Encourage oral fluids to ensure adequate hydration  - Administer IV fluids if ordered to ensure adequate hydration  - Administer ordered medications as needed  - Encourage mobilization and activity  - Consider nutritional services referral to assist patient with adequate nutrition and appropriate food choices  Outcome: Progressing  Goal: Maintains adequate nutritional intake  Description  INTERVENTIONS:  - Monitor percentage of each meal consumed  - Identify factors contributing to decreased intake, treat as appropriate  - Assist with meals as needed  - Monitor I&O, weight, and lab values if indicated  - Obtain nutrition services referral as needed  Outcome: Progressing     Problem: METABOLIC, FLUID AND ELECTROLYTES - ADULT  Goal: Electrolytes maintained within normal limits  Description  INTERVENTIONS:  - Monitor labs and assess patient for signs and symptoms of electrolyte imbalances  - Administer electrolyte replacement as ordered  - Monitor response to electrolyte replacements, including repeat lab results as appropriate  - Instruct patient on fluid and nutrition as appropriate  Outcome: Progressing  Goal: Fluid balance maintained  Description  INTERVENTIONS:  - Monitor labs   - Monitor I/O and WT  - Instruct patient on fluid and nutrition as appropriate  - Assess for signs & symptoms of volume excess or deficit  Outcome: Progressing     Problem: SKIN/TISSUE INTEGRITY - ADULT  Goal: Skin integrity remains intact  Description  INTERVENTIONS  - Identify patients at risk for skin breakdown  - Assess and monitor skin integrity  - Assess and monitor nutrition and hydration status  - Monitor labs (i e  albumin)  - Assess for incontinence   - Turn and reposition patient  - Assist with mobility/ambulation  - Relieve pressure over bony prominences  - Avoid friction and shearing  - Provide appropriate hygiene as needed including keeping skin clean and dry  - Evaluate need for skin moisturizer/barrier cream  - Collaborate with interdisciplinary team (i e  Nutrition, Rehabilitation, etc )   - Patient/family teaching  Outcome: Progressing  Goal: Oral mucous membranes remain intact  Description  INTERVENTIONS  - Assess oral mucosa and hygiene practices  - Implement preventative oral hygiene regimen  - Implement oral medicated treatments as ordered  - Initiate Nutrition services referral as needed  Outcome: Progressing     Problem: MUSCULOSKELETAL - ADULT  Goal: Maintain or return mobility to safest level of function  Description  INTERVENTIONS:  - Assess patient's ability to carry out ADLs; assess patient's baseline for ADL function and identify physical deficits which impact ability to perform ADLs (bathing, care of mouth/teeth, toileting, grooming, dressing, etc )  - Assess/evaluate cause of self-care deficits   - Assess range of motion  - Assess patient's mobility  - Assess patient's need for assistive devices and provide as appropriate  - Encourage maximum independence but intervene and supervise when necessary  - Involve family in performance of ADLs  - Assess for home care needs following discharge   - Consider OT consult to assist with ADL evaluation and planning for discharge  - Provide patient education as appropriate  Outcome: Progressing     Problem: PAIN - ADULT  Goal: Verbalizes/displays adequate comfort level or baseline comfort level  Description  Interventions:  - Encourage patient to monitor pain and request assistance  - Assess pain using appropriate pain scale  - Administer analgesics based on type and severity of pain and evaluate response  - Implement non-pharmacological measures as appropriate and evaluate response  - Consider cultural and social influences on pain and pain management  - Notify physician/advanced practitioner if interventions unsuccessful or patient reports new pain  Outcome: Progressing     Problem: INFECTION - ADULT  Goal: Absence or prevention of progression during hospitalization  Description  INTERVENTIONS:  - Assess and monitor for signs and symptoms of infection  - Monitor lab/diagnostic results  - Monitor all insertion sites, i e  indwelling lines, tubes, and drains  - Monitor endotracheal if appropriate and nasal secretions for changes in amount and color  - [place] appropriate cooling/warming therapies per order  - Administer medications as ordered  - Instruct and encourage patient and family to use good hand hygiene technique  - Identify and instruct in appropriate isolation precautions for identified infection/condition  Outcome: Progressing  Goal: Absence of fever/infection during neutropenic period  Description  INTERVENTIONS:  - Monitor WBC    Outcome: Progressing     Problem: SAFETY ADULT  Goal: Maintain or return to baseline ADL function  Description  INTERVENTIONS:  -  Assess patient's ability to carry out ADLs; assess patient's baseline for ADL function and identify physical deficits which impact ability to perform ADLs (bathing, care of mouth/teeth, toileting, grooming, dressing, etc )  - Assess/evaluate cause of self-care deficits   - Assess range of motion  - Assess patient's mobility; develop plan if impaired  - Assess patient's need for assistive devices and provide as appropriate  - Encourage maximum independence but intervene and supervise when necessary  - Involve family in performance of ADLs  - Assess for home care needs following discharge   - Consider OT consult to assist with ADL evaluation and planning for discharge  - Provide patient education as appropriate  Outcome: Progressing  Goal: Maintain or return mobility status to optimal level  Description  INTERVENTIONS:  - Assess patient's baseline mobility status (ambulation, transfers, stairs, etc )    - Identify cognitive and physical deficits and behaviors that affect mobility  - Identify mobility aids required to assist with transfers and/or ambulation (gait belt, sit-to-stand, lift, walker, cane, etc )  - [place] fall precautions as indicated by assessment  - Record patient progress and toleration of activity level on Mobility SBAR; progress patient to next Phase/Stage  - Instruct patient to call for assistance with activity based on assessment  - Consider rehabilitation consult to assist with strengthening/weightbearing, etc   Outcome: Progressing     Problem: DISCHARGE PLANNING  Goal: Discharge to home or other facility with appropriate resources  Description  INTERVENTIONS:  - Identify barriers to discharge w/patient and caregiver  - Arrange for needed discharge resources and transportation as appropriate  - Identify discharge learning needs (meds, wound care, etc )  - Arrange for interpretive services to assist at discharge as needed  - Refer to Case Management Department for coordinating discharge planning if the patient needs post-hospital services based on physician/advanced practitioner order or complex needs related to functional status, cognitive ability, or social support system  Outcome: Progressing     Problem: Knowledge Deficit  Goal: Patient/family/caregiver demonstrates understanding of disease process, treatment plan, medications, and discharge instructions  Description  Complete learning assessment and assess knowledge base    Interventions:  - Provide teaching at level of understanding  - Provide teaching via preferred learning methods  Outcome: Progressing

## 2020-07-19 NOTE — QUICK NOTE
Spoke with nurse  Patient has had a bowel movement and continues to pass flatus  His NGT output has decreased  Will plan for NGT clamp trial  If he does not have significant residual, will remove NGT and start clear liquids  If he fails NGT clamp trial, will still proceed with planned surgical exploration tomorrow

## 2020-07-19 NOTE — NURSING NOTE
RN spoke with surgeon via telephone @ 6118 with verbal instructions  to clamp NG tube 4 hours, check residual, and if less than 200 cc pull tube  Residual measured @ 0135 with 00 cc remaining  NG tube pulled per instructions  Pt is at rest with call bell and belongings within reach  Pt instructed to ring if any symptoms occur

## 2020-07-19 NOTE — PLAN OF CARE
Problem: Potential for Falls  Goal: Patient will remain free of falls  Description  INTERVENTIONS:  - Assess patient frequently for physical needs  -  Identify cognitive and physical deficits and behaviors that affect risk of falls    -  [place] fall precautions as indicated by assessment   - Educate patient/family on patient safety including physical limitations  - Instruct patient to call for assistance with activity based on assessment  - Modify environment to reduce risk of injury  - Consider OT/PT consult to assist with strengthening/mobility  Outcome: Progressing     Problem: GASTROINTESTINAL - ADULT  Goal: Minimal or absence of nausea and/or vomiting  Description  INTERVENTIONS:  - Administer IV fluids if ordered to ensure adequate hydration  - Maintain NPO status until nausea and vomiting are resolved  - Nasogastric tube if ordered  - Administer ordered antiemetic medications as needed  - Provide nonpharmacologic comfort measures as appropriate  - Advance diet as tolerated, if ordered  - Consider nutrition services referral to assist patient with adequate nutrition and appropriate food choices  Outcome: Progressing  Goal: Maintains or returns to baseline bowel function  Description  INTERVENTIONS:  - Assess bowel function  - Encourage oral fluids to ensure adequate hydration  - Administer IV fluids if ordered to ensure adequate hydration  - Administer ordered medications as needed  - Encourage mobilization and activity  - Consider nutritional services referral to assist patient with adequate nutrition and appropriate food choices  Outcome: Progressing  Goal: Maintains adequate nutritional intake  Description  INTERVENTIONS:  - Monitor percentage of each meal consumed  - Identify factors contributing to decreased intake, treat as appropriate  - Assist with meals as needed  - Monitor I&O, weight, and lab values if indicated  - Obtain nutrition services referral as needed  Outcome: Progressing     Problem: METABOLIC, FLUID AND ELECTROLYTES - ADULT  Goal: Electrolytes maintained within normal limits  Description  INTERVENTIONS:  - Monitor labs and assess patient for signs and symptoms of electrolyte imbalances  - Administer electrolyte replacement as ordered  - Monitor response to electrolyte replacements, including repeat lab results as appropriate  - Instruct patient on fluid and nutrition as appropriate  Outcome: Progressing  Goal: Fluid balance maintained  Description  INTERVENTIONS:  - Monitor labs   - Monitor I/O and WT  - Instruct patient on fluid and nutrition as appropriate  - Assess for signs & symptoms of volume excess or deficit  Outcome: Progressing     Problem: SKIN/TISSUE INTEGRITY - ADULT  Goal: Skin integrity remains intact  Description  INTERVENTIONS  - Identify patients at risk for skin breakdown  - Assess and monitor skin integrity  - Assess and monitor nutrition and hydration status  - Monitor labs (i e  albumin)  - Assess for incontinence   - Turn and reposition patient  - Assist with mobility/ambulation  - Relieve pressure over bony prominences  - Avoid friction and shearing  - Provide appropriate hygiene as needed including keeping skin clean and dry  - Evaluate need for skin moisturizer/barrier cream  - Collaborate with interdisciplinary team (i e  Nutrition, Rehabilitation, etc )   - Patient/family teaching  Outcome: Progressing  Goal: Oral mucous membranes remain intact  Description  INTERVENTIONS  - Assess oral mucosa and hygiene practices  - Implement preventative oral hygiene regimen  - Implement oral medicated treatments as ordered  - Initiate Nutrition services referral as needed  Outcome: Progressing     Problem: MUSCULOSKELETAL - ADULT  Goal: Maintain or return mobility to safest level of function  Description  INTERVENTIONS:  - Assess patient's ability to carry out ADLs; assess patient's baseline for ADL function and identify physical deficits which impact ability to perform ADLs (bathing, care of mouth/teeth, toileting, grooming, dressing, etc )  - Assess/evaluate cause of self-care deficits   - Assess range of motion  - Assess patient's mobility  - Assess patient's need for assistive devices and provide as appropriate  - Encourage maximum independence but intervene and supervise when necessary  - Involve family in performance of ADLs  - Assess for home care needs following discharge   - Consider OT consult to assist with ADL evaluation and planning for discharge  - Provide patient education as appropriate  Outcome: Progressing     Problem: PAIN - ADULT  Goal: Verbalizes/displays adequate comfort level or baseline comfort level  Description  Interventions:  - Encourage patient to monitor pain and request assistance  - Assess pain using appropriate pain scale  - Administer analgesics based on type and severity of pain and evaluate response  - Implement non-pharmacological measures as appropriate and evaluate response  - Consider cultural and social influences on pain and pain management  - Notify physician/advanced practitioner if interventions unsuccessful or patient reports new pain  Outcome: Progressing     Problem: INFECTION - ADULT  Goal: Absence or prevention of progression during hospitalization  Description  INTERVENTIONS:  - Assess and monitor for signs and symptoms of infection  - Monitor lab/diagnostic results  - Monitor all insertion sites, i e  indwelling lines, tubes, and drains  - Monitor endotracheal if appropriate and nasal secretions for changes in amount and color  - [place] appropriate cooling/warming therapies per order  - Administer medications as ordered  - Instruct and encourage patient and family to use good hand hygiene technique  - Identify and instruct in appropriate isolation precautions for identified infection/condition  Outcome: Progressing  Goal: Absence of fever/infection during neutropenic period  Description  INTERVENTIONS:  - Monitor WBC    Outcome: Progressing     Problem: SAFETY ADULT  Goal: Maintain or return to baseline ADL function  Description  INTERVENTIONS:  -  Assess patient's ability to carry out ADLs; assess patient's baseline for ADL function and identify physical deficits which impact ability to perform ADLs (bathing, care of mouth/teeth, toileting, grooming, dressing, etc )  - Assess/evaluate cause of self-care deficits   - Assess range of motion  - Assess patient's mobility; develop plan if impaired  - Assess patient's need for assistive devices and provide as appropriate  - Encourage maximum independence but intervene and supervise when necessary  - Involve family in performance of ADLs  - Assess for home care needs following discharge   - Consider OT consult to assist with ADL evaluation and planning for discharge  - Provide patient education as appropriate  Outcome: Progressing  Goal: Maintain or return mobility status to optimal level  Description  INTERVENTIONS:  - Assess patient's baseline mobility status (ambulation, transfers, stairs, etc )    - Identify cognitive and physical deficits and behaviors that affect mobility  - Identify mobility aids required to assist with transfers and/or ambulation (gait belt, sit-to-stand, lift, walker, cane, etc )  - [place] fall precautions as indicated by assessment  - Record patient progress and toleration of activity level on Mobility SBAR; progress patient to next Phase/Stage  - Instruct patient to call for assistance with activity based on assessment  - Consider rehabilitation consult to assist with strengthening/weightbearing, etc   Outcome: Progressing     Problem: DISCHARGE PLANNING  Goal: Discharge to home or other facility with appropriate resources  Description  INTERVENTIONS:  - Identify barriers to discharge w/patient and caregiver  - Arrange for needed discharge resources and transportation as appropriate  - Identify discharge learning needs (meds, wound care, etc )  - Arrange for interpretive services to assist at discharge as needed  - Refer to Case Management Department for coordinating discharge planning if the patient needs post-hospital services based on physician/advanced practitioner order or complex needs related to functional status, cognitive ability, or social support system  Outcome: Progressing     Problem: Knowledge Deficit  Goal: Patient/family/caregiver demonstrates understanding of disease process, treatment plan, medications, and discharge instructions  Description  Complete learning assessment and assess knowledge base    Interventions:  - Provide teaching at level of understanding  - Provide teaching via preferred learning methods  Outcome: Progressing

## 2020-07-19 NOTE — PROGRESS NOTES
Progress Note - General Surgery   Charlotte Weaver 53 y o  male MRN: [id number]  Unit/Bed#: -01 Encounter: [id number]    Assessment:  53 y o  M w/ resolving high grade SBO, w/o hx of abdominal surgeries- CEA normal  -afebrile, VSS, no leukocytosis, hypokalemia  -NGT d/c 7/18  -denies abdominal pain, tolerating soft diet, +bowel function  -soft, mild distention, non-tender, +BS    Plan:  Continue diet as tolerated, if no nausea after lunch will likely d/c  Reduce IVF  Replace potassium  PRN pain meds/antiemetics  DVT ppx  Encourage OOB and ambulate    Subjective: Doing well, no overnight issues  Toleratied surgisoft breakfast with N/V  +BMs, continues passing gas  Denies abdominal pain  Ambulating  Denies fever, chills, CP, SOB  Objective:     Blood pressure 132/82, pulse 98, temperature 97 9 °F (36 6 °C), temperature source Temporal, resp  rate 18, height 5' 11" (1 803 m), weight 75 8 kg (167 lb), SpO2 96 %  ,Body mass index is 23 29 kg/m²  Intake/Output Summary (Last 24 hours) at 7/19/2020 0936  Last data filed at 7/18/2020 2141  Gross per 24 hour   Intake 150 ml   Output 900 ml   Net -750 ml       Invasive Devices     Peripheral Intravenous Line            Peripheral IV 07/15/20 Left Antecubital 4 days          Drain            NG/OG/Enteral Tube Nasogastric 18 Fr Left nares 3 days                Physical Exam   Constitutional: He appears well-developed and well-nourished  No distress  Cardiovascular: Normal rate, regular rhythm and normal heart sounds  Exam reveals no gallop and no friction rub  No murmur heard  Pulmonary/Chest: Effort normal and breath sounds normal  No respiratory distress  He has no wheezes  Skin: Skin is warm and dry  Psychiatric: He has a normal mood and affect        Abdomen- soft, mild distention, non-tender, BS +, No guarding or rigidity  Extremities- no calf tenderness or pedal edema    Scheduled Meds:  Current Facility-Administered Medications:  enoxaparin 40 mg Subcutaneous Daily Yohannes Omalley MD    folic acid 1 mg, thiamine 100 mg in 0 9% sodium chloride 100 mL IVPB  Intravenous Daily Yohannes Omalley MD Last Rate: 200 mL/hr at 07/19/20 3918   Labetalol HCl 10 mg Intravenous Q4H PRN Yohannes Omalley MD    morphine injection 2 mg Intravenous Q4H PRN Yohannes Omalley MD    Or        morphine injection 4 mg Intravenous Q4H PRN Yohannes Omalley MD    nicotine 1 patch Transdermal Daily Yohannes Omalley MD    ondansetron 4 mg Intravenous Q4H PRN Yohannes Omalley MD    phenol 1 spray Mouth/Throat Q2H PRN Yohannes Omalley MD    sodium chloride 100 mL/hr Intravenous Continuous Yohannes Omalley MD Last Rate: 100 mL/hr (07/19/20 0150)     Continuous Infusions:  sodium chloride 100 mL/hr Last Rate: 100 mL/hr (07/19/20 0150)     PRN Meds: Labetalol HCl    morphine injection **OR** morphine injection    ondansetron    phenol      Lab, Imaging and other studies:I have personally reviewed pertinent lab results      VTE Pharmacologic Prophylaxis: Enoxaparin (Lovenox)  VTE Mechanical Prophylaxis: sequential compression device      Author OLU Morris  7/19/2020 9:36 AM

## 2020-07-19 NOTE — UTILIZATION REVIEW
Initial Clinical Review    Admission: Date/Time/Statement: Admission Orders (From admission, onward)     Ordered        07/18/20 1436  Inpatient Admission  Once        Transfer from [facility] to [facility] for higher level of care           Orders Placed This Encounter   Procedures    Inpatient Admission     Standing Status:   Standing     Number of Occurrences:   1     Order Specific Question:   Admitting Physician     Answer:   Red Jaimes     Order Specific Question:   Level of Care     Answer:   Med Surg [16]     Order Specific Question:   Estimated length of stay     Answer:   More than 2 Midnights     Order Specific Question:   Certification     Answer:   I certify that inpatient services are medically necessary for this patient for a duration of greater than two midnights  See H&P and MD Progress Notes for additional information about the patient's course of treatment  Assessment/Plan:     Mr Cira Moore is a 52 yo male who presents as a transfer from the IP unit at [facility] to [facility] with ongoing N/V, abd distention, abd pain and found to have a SBO  He has an NGT in place with significant output  He will need surgical intervention for SBO  He is admitted to INPATIENT status with SBO - NPO, NGT, PRN analgesia, possible OR 7/19  Hypokalemia - replete  ARF - improving with IV fluids  PMH: tobacco abuse and alcohol abuse  7/19 Surgery Progress Note:   Pt is passing flatus and had a BM today  NGT output has decreased  Will do clamping trial of NGT and if no significant residual with d/c NGT and start on clear liquids  If not then pt will go to the OR 7/20  NGT was removed and pt can have a diet           Pain Score       No Pain        Wt Readings from Last 1 Encounters:   07/18/20 75 8 kg (167 lb)     Additional Vital Signs:   07/19/20 1100    98    132/82         07/19/20 0732  97 9 °F (36 6 °C)  98  18  132/82  96 %  None (Room air)  Lying   07/19/20 0700    98   132/82         07/19/20 0230    101    158/75         07/18/20 2235  98 2 °F (36 8 °C)  90  17  166/93  94 %  None (Room air)  Lying   07/18/20 1810    50Abnormal     156/96         07/18/20 1410  98 8 °F (37 1 °C)  50Abnormal   16  156/96  95 %  None (Room air)  Lying     Pertinent Labs/Diagnostic Test Results:     7/18 Xray abd - obstruction series - Findings suspicious for small bowel obstruction, stable during the interim    Enteric feeding tube terminates in the stomach    Results from last 7 days   Lab Units 07/15/20  1132   SARS-COV-2  Negative     Results from last 7 days   Lab Units 07/19/20  0612 07/18/20  0513 07/17/20  0434 07/16/20  0433 07/15/20  0805   WBC Thousand/uL 6 40 5 45 3 18* 3 46* 6 24   HEMOGLOBIN g/dL 14 0 14 0 14 3 15 1 17 3*   HEMATOCRIT % 40 8* 41 8 42 9 44 5 49 7*   PLATELETS Thousands/uL 299 264 248 257 278   NEUTROS ABS Thousands/µL 4 20  --   --   --  5 06   BANDS PCT %  --  1  --  6  --          Results from last 7 days   Lab Units 07/19/20  0612 07/18/20  0513 07/17/20  0434 07/16/20  0433 07/15/20  1835 07/15/20  1145 07/15/20  0805   SODIUM mmol/L 135 141 140 138 133* 129* 128*   POTASSIUM mmol/L 3 4* 3 4* 3 2* 3 4* 3 9 4 4 4 6   CHLORIDE mmol/L 95* 99* 100 94* 94* 94* 89*   CO2 mmol/L 31 33* 33* 32 31 27 26   ANION GAP mmol/L 9 9 7 12 8 8 13   BUN mg/dL 32* 33* 35* 44* 39* 35* 35*   CREATININE mg/dL 0 80 0 88 1 12 1 30 1 34* 1 30 1 67*   EGFR ml/min/1 73sq m 101 97 74 61 59 61 45   CALCIUM mg/dL 8 3* 8 7 8 5 8 5 8 9 9 0 10 4*   MAGNESIUM mg/dL  --   --  2 6 2 2  --  1 8 2 0   PHOSPHORUS mg/dL  --   --  3 1  --   --  5 8*  --      Results from last 7 days   Lab Units 07/19/20  0612 07/18/20  0513 07/17/20  0434 07/16/20  0433 07/15/20  1145 07/15/20  0805   AST U/L 13 16 19 39 102* 163*   ALT U/L 23 47 68 117* 176* 222*   ALK PHOS U/L 26* 37* 40* 44* 51 59   TOTAL PROTEIN g/dL 5 9* 6 5 6 8 7 0 7 3 8 7*   ALBUMIN g/dL 3 5 3 0* 3 2* 3 4* 3 6 4 3   TOTAL BILIRUBIN mg/dL 0  60 0 70 0 70 0 70 0 70 0 80   BILIRUBIN DIRECT mg/dL  --   --   --   --   --  0 25*         Results from last 7 days   Lab Units 07/19/20  0612 07/18/20  0513 07/17/20  0434 07/16/20  0433 07/15/20  1835 07/15/20  1145 07/15/20  0805   GLUCOSE RANDOM mg/dL 93 67 73 89 96 126 147*     Results from last 7 days   Lab Units 07/15/20  1145 07/15/20  0805   TROPONIN I ng/mL <0 02 <0 02     Results from last 7 days   Lab Units 07/15/20  0805   PROTIME seconds 12 3   INR  0 91   PTT seconds 25     Results from last 7 days   Lab Units 07/15/20  0805   TSH 3RD GENERATON uIU/mL 1 342     Results from last 7 days   Lab Units 07/18/20  0513 07/16/20  0433 07/15/20  1145   PROCALCITONIN ng/ml 0 20 0 43* 0 60*     Results from last 7 days   Lab Units 07/15/20  1459 07/15/20  1034   LACTIC ACID mmol/L 1 3 1 3     Results from last 7 days   Lab Units 07/15/20  1145   HEP B S AG  Non-reactive   HEP C AB  Non-reactive   HEP B C IGM  Non-reactive     Results from last 7 days   Lab Units 07/15/20  0805   LIPASE u/L 119         Results from last 7 days   Lab Units 07/15/20  1258   CEA ng/mL 2 0     Results from last 7 days   Lab Units 07/16/20  0118 07/15/20  1453   CLARITY UA  Clear  --    COLOR UA  Jessi  --    SPEC GRAV UA  1 020  --    PH UA  5 5  --    GLUCOSE UA mg/dl Negative  --    KETONES UA mg/dl 15 (1+)*  --    BLOOD UA  Negative  --    PROTEIN UA mg/dl 30 (1+)*  --    NITRITE UA  Negative  --    BILIRUBIN UA  Interference- unable to analyze*  --    UROBILINOGEN UA E U /dl 1 0  --    LEUKOCYTES UA  Negative  --    WBC UA /hpf None Seen  --    RBC UA /hpf 0-1*  --    BACTERIA UA /hpf Occasional  --    EPITHELIAL CELLS WET PREP /hpf None Seen  --    SODIUM UR   --  11   CREATININE UR mg/dL  --  198 0     Present on Admission:   Tobacco use   Alcohol abuse   Acute renal failure (ARF) (HCC)   (Resolved) Small bowel obstruction (HCC)    Admitting Diagnosis: Small bowel obstruction ([facility] [address] ) [P40 732]     Age/Sex: 53 y o  male Admission Orders:  Scheduled Medications:    Medications:  enoxaparin 40 mg Subcutaneous Daily   folic acid 1 mg, thiamine 100 mg in 0 9% sodium chloride 100 mL IVPB  Intravenous Daily   nicotine 1 patch Transdermal Daily     Continuous IV Infusions:    sodium chloride 75 mL/hr Intravenous Continuous     PRN Meds:    aluminum-magnesium hydroxide-simethicone 30 mL Oral Q4H PRN x1 7/19   Labetalol HCl 10 mg Intravenous Q4H PRN    morphine injection 2 mg Intravenous Q4H PRN    Or       morphine injection 4 mg Intravenous Q4H PRN    ondansetron 4 mg Intravenous Q4H PRN x1 7/19   phenol 1 spray Mouth/Throat Q2H PRN      SCDs  NGT with removal 7/19  Surgical soft meal   CIWA = 0 consistently  IV fluids  OOB as karen   IP CONSULT TO ACUTE CARE SURGERY    Network Utilization Review Department  [email or web address]  ATTENTION: Please call with any questions or concerns to [phone] and carefully listen to the prompts so that you are directed to the right person  All voicemails are confidential   Sudie Crigler all requests for admission clinical reviews, approved or denied determinations and any other requests to dedicated fax number below belonging to the campus where the patient is receiving treatment   List of dedicated fax numbers for the Facilities:  [address] [place] [address] DENIALS (Administrative/Medical Necessity) [phone]   [address] (Maternity/NICU/Pediatrics) [phone]   Jericho Min [phone]   Vicky Sheets [phone]   Sabra Juniper [phone]   [address]  [phone]   [address] [facility] [address] [facility] [phone]   [facility]  [phone]   [address] [facility] [address]  [address] [facility] [address] [facility] [phone]

## 2020-07-20 ENCOUNTER — APPOINTMENT (INPATIENT)
Dept: RADIOLOGY | Facility: HOSPITAL | Age: 55
DRG: 389 | End: 2020-07-20
Payer: COMMERCIAL

## 2020-07-20 LAB
ABO GROUP BLD: NORMAL
ABO GROUP BLD: NORMAL
ANION GAP SERPL CALCULATED.3IONS-SCNC: 11 MMOL/L (ref 4–13)
BASOPHILS # BLD AUTO: 0 THOUSANDS/ΜL (ref 0–0.1)
BASOPHILS NFR BLD AUTO: 0 % (ref 0–2)
BLD GP AB SCN SERPL QL: NEGATIVE
BUN SERPL-MCNC: 30 MG/DL (ref 7–25)
CALCIUM SERPL-MCNC: 8.1 MG/DL (ref 8.6–10.5)
CHLORIDE SERPL-SCNC: 95 MMOL/L (ref 98–107)
CO2 SERPL-SCNC: 31 MMOL/L (ref 21–31)
CREAT SERPL-MCNC: 0.99 MG/DL (ref 0.7–1.3)
EOSINOPHIL # BLD AUTO: 0.1 THOUSAND/ΜL (ref 0–0.61)
EOSINOPHIL NFR BLD AUTO: 1 % (ref 0–5)
ERYTHROCYTE [DISTWIDTH] IN BLOOD BY AUTOMATED COUNT: 12.9 % (ref 11.5–14.5)
GFR SERPL CREATININE-BSD FRML MDRD: 85 ML/MIN/1.73SQ M
GLUCOSE SERPL-MCNC: 84 MG/DL (ref 65–99)
HCT VFR BLD AUTO: 41.8 % (ref 42–47)
HGB BLD-MCNC: 13.9 G/DL (ref 14–18)
LYMPHOCYTES # BLD AUTO: 0.7 THOUSANDS/ΜL (ref 0.6–4.47)
LYMPHOCYTES NFR BLD AUTO: 9 % (ref 21–51)
MAGNESIUM SERPL-MCNC: 2.2 MG/DL (ref 1.9–2.7)
MCH RBC QN AUTO: 32.7 PG (ref 26–34)
MCHC RBC AUTO-ENTMCNC: 33.2 G/DL (ref 31–37)
MCV RBC AUTO: 98 FL (ref 81–99)
MONOCYTES # BLD AUTO: 0.7 THOUSAND/ΜL (ref 0.17–1.22)
MONOCYTES NFR BLD AUTO: 9 % (ref 2–12)
NEUTROPHILS # BLD AUTO: 6.7 THOUSANDS/ΜL (ref 1.4–6.5)
NEUTS SEG NFR BLD AUTO: 81 % (ref 42–75)
PHOSPHATE SERPL-MCNC: 2.2 MG/DL (ref 3–5.5)
PLATELET # BLD AUTO: 308 THOUSANDS/UL (ref 149–390)
PMV BLD AUTO: 7.6 FL (ref 8.6–11.7)
POTASSIUM SERPL-SCNC: 3.5 MMOL/L (ref 3.5–5.5)
RBC # BLD AUTO: 4.25 MILLION/UL (ref 4.3–5.9)
RH BLD: POSITIVE
RH BLD: POSITIVE
SODIUM SERPL-SCNC: 137 MMOL/L (ref 134–143)
SPECIMEN EXPIRATION DATE: NORMAL
WBC # BLD AUTO: 8.2 THOUSAND/UL (ref 4.8–10.8)

## 2020-07-20 PROCEDURE — C9113 INJ PANTOPRAZOLE SODIUM, VIA: HCPCS | Performed by: SURGERY

## 2020-07-20 PROCEDURE — 85025 COMPLETE CBC W/AUTO DIFF WBC: CPT | Performed by: PHYSICIAN ASSISTANT

## 2020-07-20 PROCEDURE — 86900 BLOOD TYPING SEROLOGIC ABO: CPT | Performed by: SURGERY

## 2020-07-20 PROCEDURE — 86901 BLOOD TYPING SEROLOGIC RH(D): CPT | Performed by: SURGERY

## 2020-07-20 PROCEDURE — 80048 BASIC METABOLIC PNL TOTAL CA: CPT | Performed by: PHYSICIAN ASSISTANT

## 2020-07-20 PROCEDURE — 99232 SBSQ HOSP IP/OBS MODERATE 35: CPT | Performed by: SURGERY

## 2020-07-20 PROCEDURE — 83735 ASSAY OF MAGNESIUM: CPT | Performed by: PHYSICIAN ASSISTANT

## 2020-07-20 PROCEDURE — 86850 RBC ANTIBODY SCREEN: CPT | Performed by: SURGERY

## 2020-07-20 PROCEDURE — 84100 ASSAY OF PHOSPHORUS: CPT | Performed by: PHYSICIAN ASSISTANT

## 2020-07-20 PROCEDURE — 71045 X-RAY EXAM CHEST 1 VIEW: CPT

## 2020-07-20 RX ORDER — CEFAZOLIN SODIUM 2 G/50ML
2000 SOLUTION INTRAVENOUS
Status: CANCELLED | OUTPATIENT
Start: 2020-07-21 | End: 2020-07-22

## 2020-07-20 RX ORDER — HEPARIN SODIUM 5000 [USP'U]/ML
5000 INJECTION, SOLUTION INTRAVENOUS; SUBCUTANEOUS EVERY 8 HOURS SCHEDULED
Status: DISCONTINUED | OUTPATIENT
Start: 2020-07-20 | End: 2020-07-20

## 2020-07-20 RX ORDER — LIDOCAINE HYDROCHLORIDE 20 MG/ML
JELLY TOPICAL ONCE
Status: COMPLETED | OUTPATIENT
Start: 2020-07-20 | End: 2020-07-20

## 2020-07-20 RX ORDER — HEPARIN SODIUM 5000 [USP'U]/ML
5000 INJECTION, SOLUTION INTRAVENOUS; SUBCUTANEOUS EVERY 8 HOURS SCHEDULED
Status: COMPLETED | OUTPATIENT
Start: 2020-07-20 | End: 2020-07-20

## 2020-07-20 RX ADMIN — SODIUM CHLORIDE 75 ML/HR: 0.9 INJECTION, SOLUTION INTRAVENOUS at 02:36

## 2020-07-20 RX ADMIN — HEPARIN SODIUM 5000 UNITS: 5000 INJECTION INTRAVENOUS; SUBCUTANEOUS at 14:13

## 2020-07-20 RX ADMIN — HEPARIN SODIUM 5000 UNITS: 5000 INJECTION INTRAVENOUS; SUBCUTANEOUS at 22:51

## 2020-07-20 RX ADMIN — LIDOCAINE HYDROCHLORIDE: 20 JELLY TOPICAL at 18:00

## 2020-07-20 RX ADMIN — FOLIC ACID: 5 INJECTION, SOLUTION INTRAMUSCULAR; INTRAVENOUS; SUBCUTANEOUS at 09:25

## 2020-07-20 RX ADMIN — TOPICAL ANESTHETIC 2 SPRAY: 200 SPRAY DENTAL; PERIODONTAL at 18:00

## 2020-07-20 RX ADMIN — PANTOPRAZOLE SODIUM 40 MG: 40 INJECTION, POWDER, FOR SOLUTION INTRAVENOUS at 09:25

## 2020-07-20 RX ADMIN — HEPARIN SODIUM 5000 UNITS: 5000 INJECTION INTRAVENOUS; SUBCUTANEOUS at 09:25

## 2020-07-20 RX ADMIN — SODIUM CHLORIDE 75 ML/HR: 0.9 INJECTION, SOLUTION INTRAVENOUS at 16:23

## 2020-07-20 NOTE — UTILIZATION REVIEW
Notification of Inpatient Admission/Inpatient Authorization Request   This is a Notification of Inpatient Admission for [address]  Be advised that this patient was admitted to our facility under Inpatient Status  Contact Glory Alonzo at [phone] for additional admission information  [facility] DEPT  DEDICATED [phone]  Patient Name:   Cherelle Pierre   YOB: 1965       [address]   [address]:   [address] [place]  Tax ID: [id number]  NPI: [id number] Attending Provider/NPI:  Phone:  Address: Avery Holman [[id number]]  [phone]  Same as Facility   Place of Service Code: 24 Place of Service Name: [address] [place] [address]   Start Date: 7/18/20 1407 Discharge Date & Time: No discharge date for patient encounter  Type of Admission: Inpatient Status Discharge Disposition   (if discharged): [address] [place] [address]   Patient Diagnoses: Small bowel obstruction ([facility] [address] ) [[id number]]     Orders: Admission Orders (From admission, onward)     Ordered        07/18/20 1436  Inpatient Admission  Once                    Assigned Utilization Review Contact: Supa Licona  Utilization   Network Utilization Review Department  Phone: [phone]; Fax [phone]  Email: [email or web address]   ATTENTION PAYERS: Please call the assigned Utilization  directly with any questions or concerns ALL voicemails in the department are confidential  Send all requests for admission clinical reviews, approved or denied determinations and any other requests to dedicated fax number belonging to the campus where the patient is receiving treatment

## 2020-07-20 NOTE — SOCIAL WORK
Evaluated the pt at the bedside with the spouse present  Pt was admitted to the hospital for a SBO  Explained the role of CM and the options of discharge planning with the pt  Pt states he is independent and works  Pt PCP is Dr Grecia Baxter  Pt utilizes [facility] [place]  Pt is awaiting to have his diet advanced  Wife will transport home upon discharge  Patient/caregiver received discharge checklist   Content reviewed  Patient/caregiver encouraged to participate in discharge plan of care prior to discharge home  CM reviewed d/c planning process including the following: identifying help at home, patient preference for d/c planning needs, availability of treatment team to discuss questions or concerns patient and/or family may have regarding understanding medications and recognizing signs and symptoms once discharged  CM also encouraged patient to follow up with all recommended appointments after discharge  Patient advised of importance for patient and family to participate in managing patients medical well being

## 2020-07-20 NOTE — QUICK NOTE
Case discussed with Dr Renzo Venegas  52 yo M w/o surgical hx HD6 with persistent SBO  Recommendation for surgical intervention was made  We will meeting with the patient and his wife later today to review everything in greater detail      Clear liquids today, NPO after midnight, surgery in AM

## 2020-07-20 NOTE — NURSING NOTE
Pt experiencing loose stools  Pt described as tan/brown liquid  Unwitnessed by CNA or RN  Pt not c/o N/V  Pt is at rest with call bell and belongings within reach

## 2020-07-20 NOTE — PROGRESS NOTES
Progress Note - Jorgito Foss 1965, 53 y o  male MRN: [id number]    Unit/Bed#: -01 Encounter: [id number]    Primary Care Provider: Shelbi Durán MD   Date and time admitted to hospital: 7/18/2020  2:07 PM        * Small bowel obstruction [facility])  Assessment & Plan  54 yo M without surgical hx now HD6 with SBO of unclear etiology  Patient [facility] over the weekend in anticipation of surgery, however had resolution of symptoms and surgery canceled  Was anticipating d/c last night, but had recurrent bloating, nausea, vomiting for which discharge canceled  Clinically patient with nausea, no active vomiting, mild bloating, continues with flatus, loose BMs  Exam shows mild distention, mild generalized TTP described as pressure, no guarding or rigidity, bowel sounds normoactive  Vitals show new mild tachycardia this AM, normotensive, afebrile, on room air  Blood work shows CBC/BMP unremarkable  KUB this morning with report pending  Plan:  NPO at this time  Follow-up on x-ray report when available  Discuss with Dr Sarai Church  I reviewed operative and non-operative management options with the patient and his spouse  Will defer to Dr Sarai Church to make formal recommendation after case is reviewed  All questions answered to their understanding  Subjective/Objective   Chief Complaint: "What's the plan?"    Subjective: Patient inquiring on what to do next  His spouse is upset with the prolonged course of hospitalization without definitive treatment  This morning reports mild bloating discomfort and nausea  No vomiting today  Requesting clear liquids  Had flatus and BM yesterday  No fevers or chills  No chest pain or dyspnea  Objective: Recurrent N/V yesterday, KUB completed and pending  Blood pressure 141/80, pulse (!) 106, temperature 98 4 °F (36 9 °C), temperature source Temporal, resp   rate 20, height 5' 11" (1 803 m), weight 75 8 kg (167 lb), SpO2 92 % ,Body mass index is 23 29 kg/m²  Intake/Output Summary (Last 24 hours) at 7/20/2020 0906  Last data filed at 7/19/2020 1650  Gross per 24 hour   Intake 880 ml   Output 200 ml   Net 680 ml       Invasive Devices     Peripheral Intravenous Line            Peripheral IV 07/15/20 Left Antecubital 5 days          Drain            NG/OG/Enteral Tube Nasogastric 18 Fr Left nares 4 days              Physical Exam   Constitutional: He is oriented to person, place, and time  He appears well-developed and well-nourished  No distress  HENT:   Head: Normocephalic and atraumatic  Eyes: Pupils are equal, round, and reactive to light  EOM are normal    Neck: Normal range of motion  Cardiovascular: Regular rhythm  No murmur heard  Borderline tachycardia  Pulmonary/Chest: Effort normal and breath sounds normal  No respiratory distress  Abdominal: Soft  Bowel sounds are normal  He exhibits no distension  There is no tenderness  No hernia  Mild to moderate distention  Tympanic to percussion  Mild generalized TTP described as pressure  No guarding or rigidity  Musculoskeletal: Normal range of motion  Neurological: He is alert and oriented to person, place, and time  Skin: Skin is warm and dry  Capillary refill takes less than 2 seconds  No rash noted  He is not diaphoretic  Psychiatric: He has a normal mood and affect  His behavior is normal    Nursing note and vitals reviewed  Lab, Imaging and other studies:  I have personally reviewed pertinent lab results    , CBC:   Lab Results   Component Value Date    WBC 8 20 07/20/2020    HGB 13 9 (L) 07/20/2020    HCT 41 8 (L) 07/20/2020    MCV 98 07/20/2020     07/20/2020    MCH 32 7 07/20/2020    MCHC 33 2 07/20/2020    RDW 12 9 07/20/2020    MPV 7 6 (L) 07/20/2020   , CMP:   Lab Results   Component Value Date    SODIUM 137 07/20/2020    K 3 5 07/20/2020    CL 95 (L) 07/20/2020    CO2 31 07/20/2020    BUN 30 (H) 07/20/2020    CREATININE 0 99 07/20/2020    CALCIUM 8 1 (L) 07/20/2020    EGFR 85 07/20/2020     VTE Pharmacologic Prophylaxis: Heparin  VTE Mechanical Prophylaxis: sequential compression device

## 2020-07-20 NOTE — NURSING NOTE
CNA notified RN pt experienced an episode of emesis  Up examining the contents it appeared to be green liquid  Pt c/o of burning  Physician notified  New orders received and noted  Will continue to monitor pt status with belongings and call bell within reach

## 2020-07-20 NOTE — PLAN OF CARE
Problem: Potential for Falls  Goal: Patient will remain free of falls  Description  INTERVENTIONS:  - Assess patient frequently for physical needs  -  Identify cognitive and physical deficits and behaviors that affect risk of falls    -  [place] fall precautions as indicated by assessment   - Educate patient/family on patient safety including physical limitations  - Instruct patient to call for assistance with activity based on assessment  - Modify environment to reduce risk of injury  - Consider OT/PT consult to assist with strengthening/mobility  Outcome: Progressing     Problem: GASTROINTESTINAL - ADULT  Goal: Minimal or absence of nausea and/or vomiting  Description  INTERVENTIONS:  - Administer IV fluids if ordered to ensure adequate hydration  - Maintain NPO status until nausea and vomiting are resolved  - Nasogastric tube if ordered  - Administer ordered antiemetic medications as needed  - Provide nonpharmacologic comfort measures as appropriate  - Advance diet as tolerated, if ordered  - Consider nutrition services referral to assist patient with adequate nutrition and appropriate food choices  Outcome: Progressing  Goal: Maintains or returns to baseline bowel function  Description  INTERVENTIONS:  - Assess bowel function  - Encourage oral fluids to ensure adequate hydration  - Administer IV fluids if ordered to ensure adequate hydration  - Administer ordered medications as needed  - Encourage mobilization and activity  - Consider nutritional services referral to assist patient with adequate nutrition and appropriate food choices  Outcome: Progressing  Goal: Maintains adequate nutritional intake  Description  INTERVENTIONS:  - Monitor percentage of each meal consumed  - Identify factors contributing to decreased intake, treat as appropriate  - Assist with meals as needed  - Monitor I&O, weight, and lab values if indicated  - Obtain nutrition services referral as needed  Outcome: Progressing     Problem: METABOLIC, FLUID AND ELECTROLYTES - ADULT  Goal: Electrolytes maintained within normal limits  Description  INTERVENTIONS:  - Monitor labs and assess patient for signs and symptoms of electrolyte imbalances  - Administer electrolyte replacement as ordered  - Monitor response to electrolyte replacements, including repeat lab results as appropriate  - Instruct patient on fluid and nutrition as appropriate  Outcome: Progressing  Goal: Fluid balance maintained  Description  INTERVENTIONS:  - Monitor labs   - Monitor I/O and WT  - Instruct patient on fluid and nutrition as appropriate  - Assess for signs & symptoms of volume excess or deficit  Outcome: Progressing     Problem: SKIN/TISSUE INTEGRITY - ADULT  Goal: Skin integrity remains intact  Description  INTERVENTIONS  - Identify patients at risk for skin breakdown  - Assess and monitor skin integrity  - Assess and monitor nutrition and hydration status  - Monitor labs (i e  albumin)  - Assess for incontinence   - Turn and reposition patient  - Assist with mobility/ambulation  - Relieve pressure over bony prominences  - Avoid friction and shearing  - Provide appropriate hygiene as needed including keeping skin clean and dry  - Evaluate need for skin moisturizer/barrier cream  - Collaborate with interdisciplinary team (i e  Nutrition, Rehabilitation, etc )   - Patient/family teaching  Outcome: Progressing  Goal: Oral mucous membranes remain intact  Description  INTERVENTIONS  - Assess oral mucosa and hygiene practices  - Implement preventative oral hygiene regimen  - Implement oral medicated treatments as ordered  - Initiate Nutrition services referral as needed  Outcome: Progressing     Problem: MUSCULOSKELETAL - ADULT  Goal: Maintain or return mobility to safest level of function  Description  INTERVENTIONS:  - Assess patient's ability to carry out ADLs; assess patient's baseline for ADL function and identify physical deficits which impact ability to perform ADLs (bathing, care of mouth/teeth, toileting, grooming, dressing, etc )  - Assess/evaluate cause of self-care deficits   - Assess range of motion  - Assess patient's mobility  - Assess patient's need for assistive devices and provide as appropriate  - Encourage maximum independence but intervene and supervise when necessary  - Involve family in performance of ADLs  - Assess for home care needs following discharge   - Consider OT consult to assist with ADL evaluation and planning for discharge  - Provide patient education as appropriate  Outcome: Progressing     Problem: PAIN - ADULT  Goal: Verbalizes/displays adequate comfort level or baseline comfort level  Description  Interventions:  - Encourage patient to monitor pain and request assistance  - Assess pain using appropriate pain scale  - Administer analgesics based on type and severity of pain and evaluate response  - Implement non-pharmacological measures as appropriate and evaluate response  - Consider cultural and social influences on pain and pain management  - Notify physician/advanced practitioner if interventions unsuccessful or patient reports new pain  Outcome: Progressing     Problem: INFECTION - ADULT  Goal: Absence or prevention of progression during hospitalization  Description  INTERVENTIONS:  - Assess and monitor for signs and symptoms of infection  - Monitor lab/diagnostic results  - Monitor all insertion sites, i e  indwelling lines, tubes, and drains  - Monitor endotracheal if appropriate and nasal secretions for changes in amount and color  - [place] appropriate cooling/warming therapies per order  - Administer medications as ordered  - Instruct and encourage patient and family to use good hand hygiene technique  - Identify and instruct in appropriate isolation precautions for identified infection/condition  Outcome: Progressing  Goal: Absence of fever/infection during neutropenic period  Description  INTERVENTIONS:  - Monitor WBC    Outcome: Progressing     Problem: SAFETY ADULT  Goal: Maintain or return to baseline ADL function  Description  INTERVENTIONS:  -  Assess patient's ability to carry out ADLs; assess patient's baseline for ADL function and identify physical deficits which impact ability to perform ADLs (bathing, care of mouth/teeth, toileting, grooming, dressing, etc )  - Assess/evaluate cause of self-care deficits   - Assess range of motion  - Assess patient's mobility; develop plan if impaired  - Assess patient's need for assistive devices and provide as appropriate  - Encourage maximum independence but intervene and supervise when necessary  - Involve family in performance of ADLs  - Assess for home care needs following discharge   - Consider OT consult to assist with ADL evaluation and planning for discharge  - Provide patient education as appropriate  Outcome: Progressing  Goal: Maintain or return mobility status to optimal level  Description  INTERVENTIONS:  - Assess patient's baseline mobility status (ambulation, transfers, stairs, etc )    - Identify cognitive and physical deficits and behaviors that affect mobility  - Identify mobility aids required to assist with transfers and/or ambulation (gait belt, sit-to-stand, lift, walker, cane, etc )  - [place] fall precautions as indicated by assessment  - Record patient progress and toleration of activity level on Mobility SBAR; progress patient to next Phase/Stage  - Instruct patient to call for assistance with activity based on assessment  - Consider rehabilitation consult to assist with strengthening/weightbearing, etc   Outcome: Progressing     Problem: DISCHARGE PLANNING  Goal: Discharge to home or other facility with appropriate resources  Description  INTERVENTIONS:  - Identify barriers to discharge w/patient and caregiver  - Arrange for needed discharge resources and transportation as appropriate  - Identify discharge learning needs (meds, wound care, etc )  - Arrange for interpretive services to assist at discharge as needed  - Refer to Case Management Department for coordinating discharge planning if the patient needs post-hospital services based on physician/advanced practitioner order or complex needs related to functional status, cognitive ability, or social support system  Outcome: Progressing     Problem: Knowledge Deficit  Goal: Patient/family/caregiver demonstrates understanding of disease process, treatment plan, medications, and discharge instructions  Description  Complete learning assessment and assess knowledge base    Interventions:  - Provide teaching at level of understanding  - Provide teaching via preferred learning methods  Outcome: Progressing

## 2020-07-20 NOTE — ASSESSMENT & PLAN NOTE
54 yo M without surgical hx now HD6 with SBO of unclear etiology  Patient transferred to [address] [facility] over the weekend in anticipation of surgery, however had resolution of symptoms and surgery canceled  Was anticipating d/c last night, but had recurrent bloating, nausea, vomiting for which discharge canceled  Clinically patient with nausea, no active vomiting, mild bloating, continues with flatus, loose BMs  Exam shows mild distention, mild generalized TTP described as pressure, no guarding or rigidity, bowel sounds normoactive  Vitals show new mild tachycardia this AM, normotensive, afebrile, on room air  Blood work shows CBC/BMP unremarkable  KUB this morning with report pending  Plan:  NPO at this time  Follow-up on x-ray report when available  Discuss with Dr Sarai Church  I reviewed operative and non-operative management options with the patient and his spouse  Will defer to Dr Sarai Church to make formal recommendation after case is reviewed  All questions answered to their understanding

## 2020-07-21 ENCOUNTER — ANESTHESIA (INPATIENT)
Dept: PERIOP | Facility: HOSPITAL | Age: 55
DRG: 389 | End: 2020-07-21
Payer: COMMERCIAL

## 2020-07-21 ENCOUNTER — ANESTHESIA EVENT (INPATIENT)
Dept: PERIOP | Facility: HOSPITAL | Age: 55
DRG: 389 | End: 2020-07-21
Payer: COMMERCIAL

## 2020-07-21 LAB
ANION GAP SERPL CALCULATED.3IONS-SCNC: 9 MMOL/L (ref 4–13)
BASOPHILS # BLD AUTO: 0 THOUSANDS/ΜL (ref 0–0.1)
BASOPHILS NFR BLD AUTO: 0 % (ref 0–2)
BUN SERPL-MCNC: 21 MG/DL (ref 7–25)
CALCIUM SERPL-MCNC: 7.8 MG/DL (ref 8.6–10.5)
CHLORIDE SERPL-SCNC: 102 MMOL/L (ref 98–107)
CO2 SERPL-SCNC: 26 MMOL/L (ref 21–31)
CREAT SERPL-MCNC: 0.8 MG/DL (ref 0.7–1.3)
EOSINOPHIL # BLD AUTO: 0.1 THOUSAND/ΜL (ref 0–0.61)
EOSINOPHIL NFR BLD AUTO: 1 % (ref 0–5)
ERYTHROCYTE [DISTWIDTH] IN BLOOD BY AUTOMATED COUNT: 12.8 % (ref 11.5–14.5)
GFR SERPL CREATININE-BSD FRML MDRD: 101 ML/MIN/1.73SQ M
GLUCOSE SERPL-MCNC: 72 MG/DL (ref 65–99)
HCT VFR BLD AUTO: 38.6 % (ref 42–47)
HGB BLD-MCNC: 13.5 G/DL (ref 14–18)
LYMPHOCYTES # BLD AUTO: 1.3 THOUSANDS/ΜL (ref 0.6–4.47)
LYMPHOCYTES NFR BLD AUTO: 16 % (ref 21–51)
MCH RBC QN AUTO: 33.9 PG (ref 26–34)
MCHC RBC AUTO-ENTMCNC: 35 G/DL (ref 31–37)
MCV RBC AUTO: 97 FL (ref 81–99)
MONOCYTES # BLD AUTO: 1 THOUSAND/ΜL (ref 0.17–1.22)
MONOCYTES NFR BLD AUTO: 12 % (ref 2–12)
NEUTROPHILS # BLD AUTO: 5.9 THOUSANDS/ΜL (ref 1.4–6.5)
NEUTS SEG NFR BLD AUTO: 71 % (ref 42–75)
PLATELET # BLD AUTO: 349 THOUSANDS/UL (ref 149–390)
PMV BLD AUTO: 7.7 FL (ref 8.6–11.7)
POTASSIUM SERPL-SCNC: 3.4 MMOL/L (ref 3.5–5.5)
RBC # BLD AUTO: 3.98 MILLION/UL (ref 4.3–5.9)
SODIUM SERPL-SCNC: 137 MMOL/L (ref 134–143)
WBC # BLD AUTO: 8.3 THOUSAND/UL (ref 4.8–10.8)

## 2020-07-21 PROCEDURE — 0DN84ZZ RELEASE SMALL INTESTINE, PERCUTANEOUS ENDOSCOPIC APPROACH: ICD-10-PCS | Performed by: SURGERY

## 2020-07-21 PROCEDURE — 85025 COMPLETE CBC W/AUTO DIFF WBC: CPT | Performed by: SURGERY

## 2020-07-21 PROCEDURE — 80048 BASIC METABOLIC PNL TOTAL CA: CPT | Performed by: SURGERY

## 2020-07-21 PROCEDURE — 44180 LAP ENTEROLYSIS: CPT | Performed by: PHYSICIAN ASSISTANT

## 2020-07-21 PROCEDURE — 44180 LAP ENTEROLYSIS: CPT | Performed by: SURGERY

## 2020-07-21 RX ORDER — HYDROMORPHONE HCL/PF 1 MG/ML
0.4 SYRINGE (ML) INJECTION
Status: DISCONTINUED | OUTPATIENT
Start: 2020-07-21 | End: 2020-07-21 | Stop reason: HOSPADM

## 2020-07-21 RX ORDER — HEPARIN SODIUM 5000 [USP'U]/ML
5000 INJECTION, SOLUTION INTRAVENOUS; SUBCUTANEOUS EVERY 8 HOURS SCHEDULED
Status: DISCONTINUED | OUTPATIENT
Start: 2020-07-21 | End: 2020-07-22 | Stop reason: HOSPADM

## 2020-07-21 RX ORDER — LIDOCAINE HYDROCHLORIDE 20 MG/ML
INJECTION, SOLUTION EPIDURAL; INFILTRATION; INTRACAUDAL; PERINEURAL AS NEEDED
Status: DISCONTINUED | OUTPATIENT
Start: 2020-07-21 | End: 2020-07-21 | Stop reason: SURG

## 2020-07-21 RX ORDER — ONDANSETRON 2 MG/ML
4 INJECTION INTRAMUSCULAR; INTRAVENOUS ONCE AS NEEDED
Status: DISCONTINUED | OUTPATIENT
Start: 2020-07-21 | End: 2020-07-21 | Stop reason: HOSPADM

## 2020-07-21 RX ORDER — ONDANSETRON 2 MG/ML
INJECTION INTRAMUSCULAR; INTRAVENOUS AS NEEDED
Status: DISCONTINUED | OUTPATIENT
Start: 2020-07-21 | End: 2020-07-21 | Stop reason: SURG

## 2020-07-21 RX ORDER — LEVALBUTEROL INHALATION SOLUTION 0.63 MG/3ML
0.63 SOLUTION RESPIRATORY (INHALATION) EVERY 8 HOURS PRN
Status: DISCONTINUED | OUTPATIENT
Start: 2020-07-21 | End: 2020-07-22 | Stop reason: HOSPADM

## 2020-07-21 RX ORDER — BUPIVACAINE HYDROCHLORIDE 5 MG/ML
INJECTION, SOLUTION PERINEURAL AS NEEDED
Status: DISCONTINUED | OUTPATIENT
Start: 2020-07-21 | End: 2020-07-21 | Stop reason: HOSPADM

## 2020-07-21 RX ORDER — ACETAMINOPHEN 325 MG/1
650 TABLET ORAL EVERY 6 HOURS PRN
Status: DISCONTINUED | OUTPATIENT
Start: 2020-07-21 | End: 2020-07-22

## 2020-07-21 RX ORDER — KETOROLAC TROMETHAMINE 30 MG/ML
15 INJECTION, SOLUTION INTRAMUSCULAR; INTRAVENOUS EVERY 12 HOURS
Status: DISCONTINUED | OUTPATIENT
Start: 2020-07-21 | End: 2020-07-22

## 2020-07-21 RX ORDER — PROPOFOL 10 MG/ML
INJECTION, EMULSION INTRAVENOUS AS NEEDED
Status: DISCONTINUED | OUTPATIENT
Start: 2020-07-21 | End: 2020-07-21 | Stop reason: SURG

## 2020-07-21 RX ORDER — SODIUM CHLORIDE, SODIUM LACTATE, POTASSIUM CHLORIDE, CALCIUM CHLORIDE 600; 310; 30; 20 MG/100ML; MG/100ML; MG/100ML; MG/100ML
75 INJECTION, SOLUTION INTRAVENOUS CONTINUOUS
Status: CANCELLED | OUTPATIENT
Start: 2020-07-21

## 2020-07-21 RX ORDER — KETAMINE HYDROCHLORIDE 50 MG/ML
INJECTION, SOLUTION, CONCENTRATE INTRAMUSCULAR; INTRAVENOUS AS NEEDED
Status: DISCONTINUED | OUTPATIENT
Start: 2020-07-21 | End: 2020-07-21 | Stop reason: SURG

## 2020-07-21 RX ORDER — FENTANYL CITRATE 50 UG/ML
INJECTION, SOLUTION INTRAMUSCULAR; INTRAVENOUS AS NEEDED
Status: DISCONTINUED | OUTPATIENT
Start: 2020-07-21 | End: 2020-07-21 | Stop reason: SURG

## 2020-07-21 RX ORDER — CEFAZOLIN SODIUM 1 G/3ML
INJECTION, POWDER, FOR SOLUTION INTRAMUSCULAR; INTRAVENOUS AS NEEDED
Status: DISCONTINUED | OUTPATIENT
Start: 2020-07-21 | End: 2020-07-21 | Stop reason: SURG

## 2020-07-21 RX ORDER — ROCURONIUM BROMIDE 10 MG/ML
INJECTION, SOLUTION INTRAVENOUS AS NEEDED
Status: DISCONTINUED | OUTPATIENT
Start: 2020-07-21 | End: 2020-07-21 | Stop reason: SURG

## 2020-07-21 RX ORDER — DEXAMETHASONE SODIUM PHOSPHATE 10 MG/ML
INJECTION, SOLUTION INTRAMUSCULAR; INTRAVENOUS AS NEEDED
Status: DISCONTINUED | OUTPATIENT
Start: 2020-07-21 | End: 2020-07-21 | Stop reason: SURG

## 2020-07-21 RX ORDER — MIDAZOLAM HYDROCHLORIDE 2 MG/2ML
INJECTION, SOLUTION INTRAMUSCULAR; INTRAVENOUS AS NEEDED
Status: DISCONTINUED | OUTPATIENT
Start: 2020-07-21 | End: 2020-07-21 | Stop reason: SURG

## 2020-07-21 RX ORDER — SUCCINYLCHOLINE/SOD CL,ISO/PF 100 MG/5ML
SYRINGE (ML) INTRAVENOUS AS NEEDED
Status: DISCONTINUED | OUTPATIENT
Start: 2020-07-21 | End: 2020-07-21 | Stop reason: SURG

## 2020-07-21 RX ADMIN — Medication 100 MG: at 08:40

## 2020-07-21 RX ADMIN — CEFAZOLIN 2000 MG: 1 INJECTION, POWDER, FOR SOLUTION INTRAMUSCULAR; INTRAVENOUS at 08:41

## 2020-07-21 RX ADMIN — SODIUM CHLORIDE 75 ML/HR: 0.9 INJECTION, SOLUTION INTRAVENOUS at 07:04

## 2020-07-21 RX ADMIN — KETOROLAC TROMETHAMINE 30 MG: 30 INJECTION, SOLUTION INTRAMUSCULAR at 10:37

## 2020-07-21 RX ADMIN — PROPOFOL 150 MG: 10 INJECTION, EMULSION INTRAVENOUS at 08:40

## 2020-07-21 RX ADMIN — METRONIDAZOLE 500 MG: 500 INJECTION, SOLUTION INTRAVENOUS at 08:32

## 2020-07-21 RX ADMIN — KETAMINE HYDROCHLORIDE 50 MG: 50 INJECTION, SOLUTION INTRAMUSCULAR; INTRAVENOUS at 08:53

## 2020-07-21 RX ADMIN — SUGAMMADEX 200 MG: 100 INJECTION, SOLUTION INTRAVENOUS at 09:14

## 2020-07-21 RX ADMIN — FENTANYL CITRATE 50 MCG: 50 INJECTION INTRAMUSCULAR; INTRAVENOUS at 09:28

## 2020-07-21 RX ADMIN — FENTANYL CITRATE 50 MCG: 50 INJECTION INTRAMUSCULAR; INTRAVENOUS at 08:40

## 2020-07-21 RX ADMIN — PHENYLEPHRINE HYDROCHLORIDE 200 MCG: 10 INJECTION INTRAVENOUS at 08:46

## 2020-07-21 RX ADMIN — ONDANSETRON 4 MG: 2 INJECTION INTRAMUSCULAR; INTRAVENOUS at 09:06

## 2020-07-21 RX ADMIN — HEPARIN SODIUM 5000 UNITS: 5000 INJECTION INTRAVENOUS; SUBCUTANEOUS at 21:55

## 2020-07-21 RX ADMIN — ROCURONIUM BROMIDE 50 MG: 10 INJECTION INTRAVENOUS at 08:54

## 2020-07-21 RX ADMIN — KETOROLAC TROMETHAMINE 15 MG: 30 INJECTION, SOLUTION INTRAMUSCULAR at 21:55

## 2020-07-21 RX ADMIN — LIDOCAINE HYDROCHLORIDE 100 MG: 20 INJECTION, SOLUTION EPIDURAL; INFILTRATION; INTRACAUDAL; PERINEURAL at 08:37

## 2020-07-21 RX ADMIN — DEXAMETHASONE SODIUM PHOSPHATE 10 MG: 10 INJECTION, SOLUTION INTRAMUSCULAR; INTRAVENOUS at 09:06

## 2020-07-21 RX ADMIN — HEPARIN SODIUM 5000 UNITS: 5000 INJECTION INTRAVENOUS; SUBCUTANEOUS at 15:26

## 2020-07-21 RX ADMIN — SODIUM CHLORIDE 75 ML/HR: 0.9 INJECTION, SOLUTION INTRAVENOUS at 11:51

## 2020-07-21 RX ADMIN — PHENYLEPHRINE HYDROCHLORIDE 100 MCG: 10 INJECTION INTRAVENOUS at 09:00

## 2020-07-21 RX ADMIN — MIDAZOLAM HYDROCHLORIDE 2 MG: 1 INJECTION, SOLUTION INTRAMUSCULAR; INTRAVENOUS at 08:30

## 2020-07-21 NOTE — PLAN OF CARE
Problem: Potential for Falls  Goal: Patient will remain free of falls  Description  INTERVENTIONS:  - Assess patient frequently for physical needs  -  Identify cognitive and physical deficits and behaviors that affect risk of falls    -  [place] fall precautions as indicated by assessment   - Educate patient/family on patient safety including physical limitations  - Instruct patient to call for assistance with activity based on assessment  - Modify environment to reduce risk of injury  - Consider OT/PT consult to assist with strengthening/mobility  Outcome: Progressing     Problem: GASTROINTESTINAL - ADULT  Goal: Minimal or absence of nausea and/or vomiting  Description  INTERVENTIONS:  - Administer IV fluids if ordered to ensure adequate hydration  - Maintain NPO status until nausea and vomiting are resolved  - Nasogastric tube if ordered  - Administer ordered antiemetic medications as needed  - Provide nonpharmacologic comfort measures as appropriate  - Advance diet as tolerated, if ordered  - Consider nutrition services referral to assist patient with adequate nutrition and appropriate food choices  Outcome: Progressing  Goal: Maintains or returns to baseline bowel function  Description  INTERVENTIONS:  - Assess bowel function  - Encourage oral fluids to ensure adequate hydration  - Administer IV fluids if ordered to ensure adequate hydration  - Administer ordered medications as needed  - Encourage mobilization and activity  - Consider nutritional services referral to assist patient with adequate nutrition and appropriate food choices  Outcome: Progressing  Goal: Maintains adequate nutritional intake  Description  INTERVENTIONS:  - Monitor percentage of each meal consumed  - Identify factors contributing to decreased intake, treat as appropriate  - Assist with meals as needed  - Monitor I&O, weight, and lab values if indicated  - Obtain nutrition services referral as needed  Outcome: Progressing     Problem: METABOLIC, FLUID AND ELECTROLYTES - ADULT  Goal: Electrolytes maintained within normal limits  Description  INTERVENTIONS:  - Monitor labs and assess patient for signs and symptoms of electrolyte imbalances  - Administer electrolyte replacement as ordered  - Monitor response to electrolyte replacements, including repeat lab results as appropriate  - Instruct patient on fluid and nutrition as appropriate  Outcome: Progressing  Goal: Fluid balance maintained  Description  INTERVENTIONS:  - Monitor labs   - Monitor I/O and WT  - Instruct patient on fluid and nutrition as appropriate  - Assess for signs & symptoms of volume excess or deficit  Outcome: Progressing     Problem: SKIN/TISSUE INTEGRITY - ADULT  Goal: Skin integrity remains intact  Description  INTERVENTIONS  - Identify patients at risk for skin breakdown  - Assess and monitor skin integrity  - Assess and monitor nutrition and hydration status  - Monitor labs (i e  albumin)  - Assess for incontinence   - Turn and reposition patient  - Assist with mobility/ambulation  - Relieve pressure over bony prominences  - Avoid friction and shearing  - Provide appropriate hygiene as needed including keeping skin clean and dry  - Evaluate need for skin moisturizer/barrier cream  - Collaborate with interdisciplinary team (i e  Nutrition, Rehabilitation, etc )   - Patient/family teaching  Outcome: Progressing  Goal: Oral mucous membranes remain intact  Description  INTERVENTIONS  - Assess oral mucosa and hygiene practices  - Implement preventative oral hygiene regimen  - Implement oral medicated treatments as ordered  - Initiate Nutrition services referral as needed  Outcome: Progressing     Problem: MUSCULOSKELETAL - ADULT  Goal: Maintain or return mobility to safest level of function  Description  INTERVENTIONS:  - Assess patient's ability to carry out ADLs; assess patient's baseline for ADL function and identify physical deficits which impact ability to perform ADLs (bathing, care of mouth/teeth, toileting, grooming, dressing, etc )  - Assess/evaluate cause of self-care deficits   - Assess range of motion  - Assess patient's mobility  - Assess patient's need for assistive devices and provide as appropriate  - Encourage maximum independence but intervene and supervise when necessary  - Involve family in performance of ADLs  - Assess for home care needs following discharge   - Consider OT consult to assist with ADL evaluation and planning for discharge  - Provide patient education as appropriate  Outcome: Progressing     Problem: PAIN - ADULT  Goal: Verbalizes/displays adequate comfort level or baseline comfort level  Description  Interventions:  - Encourage patient to monitor pain and request assistance  - Assess pain using appropriate pain scale  - Administer analgesics based on type and severity of pain and evaluate response  - Implement non-pharmacological measures as appropriate and evaluate response  - Consider cultural and social influences on pain and pain management  - Notify physician/advanced practitioner if interventions unsuccessful or patient reports new pain  Outcome: Progressing     Problem: INFECTION - ADULT  Goal: Absence or prevention of progression during hospitalization  Description  INTERVENTIONS:  - Assess and monitor for signs and symptoms of infection  - Monitor lab/diagnostic results  - Monitor all insertion sites, i e  indwelling lines, tubes, and drains  - Monitor endotracheal if appropriate and nasal secretions for changes in amount and color  - [place] appropriate cooling/warming therapies per order  - Administer medications as ordered  - Instruct and encourage patient and family to use good hand hygiene technique  - Identify and instruct in appropriate isolation precautions for identified infection/condition  Outcome: Progressing  Goal: Absence of fever/infection during neutropenic period  Description  INTERVENTIONS:  - Monitor WBC    Outcome: Progressing     Problem: SAFETY ADULT  Goal: Maintain or return to baseline ADL function  Description  INTERVENTIONS:  -  Assess patient's ability to carry out ADLs; assess patient's baseline for ADL function and identify physical deficits which impact ability to perform ADLs (bathing, care of mouth/teeth, toileting, grooming, dressing, etc )  - Assess/evaluate cause of self-care deficits   - Assess range of motion  - Assess patient's mobility; develop plan if impaired  - Assess patient's need for assistive devices and provide as appropriate  - Encourage maximum independence but intervene and supervise when necessary  - Involve family in performance of ADLs  - Assess for home care needs following discharge   - Consider OT consult to assist with ADL evaluation and planning for discharge  - Provide patient education as appropriate  Outcome: Progressing  Goal: Maintain or return mobility status to optimal level  Description  INTERVENTIONS:  - Assess patient's baseline mobility status (ambulation, transfers, stairs, etc )    - Identify cognitive and physical deficits and behaviors that affect mobility  - Identify mobility aids required to assist with transfers and/or ambulation (gait belt, sit-to-stand, lift, walker, cane, etc )  - [place] fall precautions as indicated by assessment  - Record patient progress and toleration of activity level on Mobility SBAR; progress patient to next Phase/Stage  - Instruct patient to call for assistance with activity based on assessment  - Consider rehabilitation consult to assist with strengthening/weightbearing, etc   Outcome: Progressing     Problem: DISCHARGE PLANNING  Goal: Discharge to home or other facility with appropriate resources  Description  INTERVENTIONS:  - Identify barriers to discharge w/patient and caregiver  - Arrange for needed discharge resources and transportation as appropriate  - Identify discharge learning needs (meds, wound care, etc )  - Arrange for interpretive services to assist at discharge as needed  - Refer to Case Management Department for coordinating discharge planning if the patient needs post-hospital services based on physician/advanced practitioner order or complex needs related to functional status, cognitive ability, or social support system  Outcome: Progressing     Problem: Knowledge Deficit  Goal: Patient/family/caregiver demonstrates understanding of disease process, treatment plan, medications, and discharge instructions  Description  Complete learning assessment and assess knowledge base    Interventions:  - Provide teaching at level of understanding  - Provide teaching via preferred learning methods  Outcome: Progressing

## 2020-07-21 NOTE — ANESTHESIA PREPROCEDURE EVALUATION
Review of Systems/Medical History  Patient summary reviewed  Chart reviewed  No history of anesthetic complications     Cardiovascular  EKG reviewed, Exercise tolerance (METS): >4, Exercise comment: Able to climb two flights of stairs without cardiopulmonary limitation      Pulmonary  Smoker (0 5 ppd) , Tobacco cessation counseling given ,        GI/Hepatic      Comment: Confirmed NPO appropriate    SBO     Negative  ROS        Endo/Other  Negative endo/other ROS      GYN       Hematology   Musculoskeletal  Negative musculoskeletal ROS        Neurology  Negative neurology ROS      Psychology       Comment: EtOH abuse, 6 beers daily         Physical Exam    Airway  Comment: NGT in situ  Mallampati score: II  TM Distance: >3 FB  Neck ROM: full     Dental   upper dentures,     Cardiovascular      Pulmonary      Other Findings        Anesthesia Plan  ASA Score- 3     Anesthesia Type- general with ASA Monitors  Additional Monitors:   Airway Plan: ETT  Comment: After a discussion of risks and benefits, patient consented to TAP block placement if conversion from laparoscopic to open procedure proves necessary  Plan Factors-    Induction- intravenous  Postoperative Plan- Plan for postoperative opioid use  Planned trial extubation    Informed Consent- Anesthetic plan and risks discussed with patient

## 2020-07-21 NOTE — DISCHARGE INSTR - AVS FIRST PAGE
[facility]    Discharge Instructions  Light activity for 2 weeks  No heavy lifting for 2 weeks  Max 10 lbs for 2 weeks  No driving for 3-7 days or until pain is well controlled  Remove dressing in 3-5 days  Surgical glue will fall off with time  You may shower over dressing, replace if soiled  Take discharge medications as prescribed  Notify our office for nausea, vomiting, fever, diarrhea, chest pain, trouble breathing  Follow up in our office in 2 weeks or sooner if needed  Call with additional questions or concerns [phone]

## 2020-07-21 NOTE — PLAN OF CARE
Problem: Potential for Falls  Goal: Patient will remain free of falls  Description  INTERVENTIONS:  - Assess patient frequently for physical needs  -  Identify cognitive and physical deficits and behaviors that affect risk of falls    -  [place] fall precautions as indicated by assessment   - Educate patient/family on patient safety including physical limitations  - Instruct patient to call for assistance with activity based on assessment  - Modify environment to reduce risk of injury  - Consider OT/PT consult to assist with strengthening/mobility  Outcome: Progressing     Problem: GASTROINTESTINAL - ADULT  Goal: Minimal or absence of nausea and/or vomiting  Description  INTERVENTIONS:  - Administer IV fluids if ordered to ensure adequate hydration  - Maintain NPO status until nausea and vomiting are resolved  - Nasogastric tube if ordered  - Administer ordered antiemetic medications as needed  - Provide nonpharmacologic comfort measures as appropriate  - Advance diet as tolerated, if ordered  - Consider nutrition services referral to assist patient with adequate nutrition and appropriate food choices  Outcome: Progressing  Goal: Maintains or returns to baseline bowel function  Description  INTERVENTIONS:  - Assess bowel function  - Encourage oral fluids to ensure adequate hydration  - Administer IV fluids if ordered to ensure adequate hydration  - Administer ordered medications as needed  - Encourage mobilization and activity  - Consider nutritional services referral to assist patient with adequate nutrition and appropriate food choices  Outcome: Progressing  Goal: Maintains adequate nutritional intake  Description  INTERVENTIONS:  - Monitor percentage of each meal consumed  - Identify factors contributing to decreased intake, treat as appropriate  - Assist with meals as needed  - Monitor I&O, weight, and lab values if indicated  - Obtain nutrition services referral as needed  Outcome: Progressing     Problem: METABOLIC, FLUID AND ELECTROLYTES - ADULT  Goal: Electrolytes maintained within normal limits  Description  INTERVENTIONS:  - Monitor labs and assess patient for signs and symptoms of electrolyte imbalances  - Administer electrolyte replacement as ordered  - Monitor response to electrolyte replacements, including repeat lab results as appropriate  - Instruct patient on fluid and nutrition as appropriate  Outcome: Progressing  Goal: Fluid balance maintained  Description  INTERVENTIONS:  - Monitor labs   - Monitor I/O and WT  - Instruct patient on fluid and nutrition as appropriate  - Assess for signs & symptoms of volume excess or deficit  Outcome: Progressing     Problem: SKIN/TISSUE INTEGRITY - ADULT  Goal: Skin integrity remains intact  Description  INTERVENTIONS  - Identify patients at risk for skin breakdown  - Assess and monitor skin integrity  - Assess and monitor nutrition and hydration status  - Monitor labs (i e  albumin)  - Assess for incontinence   - Turn and reposition patient  - Assist with mobility/ambulation  - Relieve pressure over bony prominences  - Avoid friction and shearing  - Provide appropriate hygiene as needed including keeping skin clean and dry  - Evaluate need for skin moisturizer/barrier cream  - Collaborate with interdisciplinary team (i e  Nutrition, Rehabilitation, etc )   - Patient/family teaching  Outcome: Progressing  Goal: Oral mucous membranes remain intact  Description  INTERVENTIONS  - Assess oral mucosa and hygiene practices  - Implement preventative oral hygiene regimen  - Implement oral medicated treatments as ordered  - Initiate Nutrition services referral as needed  Outcome: Progressing     Problem: MUSCULOSKELETAL - ADULT  Goal: Maintain or return mobility to safest level of function  Description  INTERVENTIONS:  - Assess patient's ability to carry out ADLs; assess patient's baseline for ADL function and identify physical deficits which impact ability to perform ADLs (bathing, care of mouth/teeth, toileting, grooming, dressing, etc )  - Assess/evaluate cause of self-care deficits   - Assess range of motion  - Assess patient's mobility  - Assess patient's need for assistive devices and provide as appropriate  - Encourage maximum independence but intervene and supervise when necessary  - Involve family in performance of ADLs  - Assess for home care needs following discharge   - Consider OT consult to assist with ADL evaluation and planning for discharge  - Provide patient education as appropriate  Outcome: Progressing     Problem: PAIN - ADULT  Goal: Verbalizes/displays adequate comfort level or baseline comfort level  Description  Interventions:  - Encourage patient to monitor pain and request assistance  - Assess pain using appropriate pain scale  - Administer analgesics based on type and severity of pain and evaluate response  - Implement non-pharmacological measures as appropriate and evaluate response  - Consider cultural and social influences on pain and pain management  - Notify physician/advanced practitioner if interventions unsuccessful or patient reports new pain  Outcome: Progressing     Problem: INFECTION - ADULT  Goal: Absence or prevention of progression during hospitalization  Description  INTERVENTIONS:  - Assess and monitor for signs and symptoms of infection  - Monitor lab/diagnostic results  - Monitor all insertion sites, i e  indwelling lines, tubes, and drains  - Monitor endotracheal if appropriate and nasal secretions for changes in amount and color  - [place] appropriate cooling/warming therapies per order  - Administer medications as ordered  - Instruct and encourage patient and family to use good hand hygiene technique  - Identify and instruct in appropriate isolation precautions for identified infection/condition  Outcome: Progressing  Goal: Absence of fever/infection during neutropenic period  Description  INTERVENTIONS:  - Monitor WBC    Outcome: Progressing     Problem: SAFETY ADULT  Goal: Maintain or return to baseline ADL function  Description  INTERVENTIONS:  -  Assess patient's ability to carry out ADLs; assess patient's baseline for ADL function and identify physical deficits which impact ability to perform ADLs (bathing, care of mouth/teeth, toileting, grooming, dressing, etc )  - Assess/evaluate cause of self-care deficits   - Assess range of motion  - Assess patient's mobility; develop plan if impaired  - Assess patient's need for assistive devices and provide as appropriate  - Encourage maximum independence but intervene and supervise when necessary  - Involve family in performance of ADLs  - Assess for home care needs following discharge   - Consider OT consult to assist with ADL evaluation and planning for discharge  - Provide patient education as appropriate  Outcome: Progressing  Goal: Maintain or return mobility status to optimal level  Description  INTERVENTIONS:  - Assess patient's baseline mobility status (ambulation, transfers, stairs, etc )    - Identify cognitive and physical deficits and behaviors that affect mobility  - Identify mobility aids required to assist with transfers and/or ambulation (gait belt, sit-to-stand, lift, walker, cane, etc )  - [place] fall precautions as indicated by assessment  - Record patient progress and toleration of activity level on Mobility SBAR; progress patient to next Phase/Stage  - Instruct patient to call for assistance with activity based on assessment  - Consider rehabilitation consult to assist with strengthening/weightbearing, etc   Outcome: Progressing     Problem: DISCHARGE PLANNING  Goal: Discharge to home or other facility with appropriate resources  Description  INTERVENTIONS:  - Identify barriers to discharge w/patient and caregiver  - Arrange for needed discharge resources and transportation as appropriate  - Identify discharge learning needs (meds, wound care, etc )  - Arrange for interpretive services to assist at discharge as needed  - Refer to Case Management Department for coordinating discharge planning if the patient needs post-hospital services based on physician/advanced practitioner order or complex needs related to functional status, cognitive ability, or social support system  Outcome: Progressing     Problem: Knowledge Deficit  Goal: Patient/family/caregiver demonstrates understanding of disease process, treatment plan, medications, and discharge instructions  Description  Complete learning assessment and assess knowledge base    Interventions:  - Provide teaching at level of understanding  - Provide teaching via preferred learning methods  Outcome: Progressing

## 2020-07-21 NOTE — ED PROVIDER NOTES
History  Chief Complaint   Patient presents with    Vomiting     States having stomach virus since Jose Daniel night  Jose Daniel night had 3 beers, felt nauseated  Monday with vomiting, normal BM  Yesterday attempted to eat, vomitted twice  No fevers  HPI  53-year-old male presents with 3 days of nausea vomiting and abdominal discomfort  Patient states that he thought he was having a stomach virus so he came in here for further evaluation  Denies any recent travel denies any sick contacts  Patient does have discomfort but states he does not have any pain  Patient states he has not been able to keep any fluids down now feels dehydrated  Patient states he is still having bowel movements  Denies any headache chest pain denies fevers chills  None       History reviewed  No pertinent past medical history  Past Surgical History:   Procedure Laterality Date    KNEE ARTHROSCOPY         History reviewed  No pertinent family history  I have reviewed and agree with the history as documented  E-Cigarette/Vaping    E-Cigarette Use Never User      E-Cigarette/Vaping Substances     Social History     Tobacco Use    Smoking status: Current Every Day Smoker     Packs/day: 2 00     Years: 40 00     Pack years: 80 00     Types: Cigarettes    Smokeless tobacco: Never Used   Substance Use Topics    Alcohol use: Yes     Alcohol/week: 42 0 standard drinks     Types: 42 Cans of beer per week     Frequency: 4 or more times a week     Drinks per session: 5 or 6     Binge frequency: Daily or almost daily    Drug use: Not Currently       Review of Systems   Constitutional: Negative  Negative for chills and fever  HENT: Negative  Negative for ear pain and sore throat  Eyes: Negative  Negative for pain and discharge  Respiratory: Negative  Negative for chest tightness and shortness of breath  Cardiovascular: Negative  Negative for chest pain and palpitations     Gastrointestinal: Positive for abdominal distention, nausea and vomiting  Negative for abdominal pain  Endocrine: Negative  Negative for polyphagia and polyuria  Genitourinary: Negative  Negative for dysuria and flank pain  Musculoskeletal: Negative  Negative for arthralgias and back pain  Skin: Negative  Negative for color change and wound  Allergic/Immunologic: Negative  Negative for food allergies and immunocompromised state  Neurological: Negative  Negative for weakness and headaches  Hematological: Negative  Negative for adenopathy  Does not bruise/bleed easily  Psychiatric/Behavioral: Negative  Negative for suicidal ideas  The patient is not nervous/anxious  Physical Exam  Physical Exam   Constitutional: He is oriented to person, place, and time  He appears well-developed and well-nourished  No distress  HENT:   Head: Normocephalic and atraumatic  Right Ear: External ear normal    Left Ear: External ear normal    Mouth/Throat: Oropharynx is clear and moist    Eyes: Pupils are equal, round, and reactive to light  Conjunctivae and EOM are normal  Right eye exhibits no discharge  Left eye exhibits no discharge  No scleral icterus  Neck: Normal range of motion  Neck supple  No tracheal deviation present  No thyromegaly present  Cardiovascular: Normal rate, regular rhythm and intact distal pulses  Exam reveals no gallop and no friction rub  No murmur heard  Pulmonary/Chest: Effort normal and breath sounds normal  No stridor  No respiratory distress  He has no wheezes  He has no rales  Abdominal: Soft  Bowel sounds are normal  He exhibits distension  There is tenderness (Mild tenderness at the epigastrium)  There is no rebound and no guarding  Musculoskeletal: Normal range of motion  He exhibits no edema or deformity  Neurological: He is alert and oriented to person, place, and time  No cranial nerve deficit  Skin: Skin is warm and dry  No rash noted  He is not diaphoretic  No erythema     Psychiatric: He has a normal mood and affect  His behavior is normal  Thought content normal    Nursing note and vitals reviewed        Vital Signs  ED Triage Vitals   Temperature Pulse Respirations Blood Pressure SpO2   07/15/20 0749 07/15/20 0749 07/15/20 0749 07/15/20 0749 07/15/20 0749   97 8 °F (36 6 °C) (!) 121 17 (!) 172/104 96 %      Temp Source Heart Rate Source Patient Position - Orthostatic VS BP Location FiO2 (%)   07/15/20 0749 07/15/20 0749 07/15/20 1130 07/15/20 0749 --   Temporal Monitor Sitting Right arm       Pain Score       07/15/20 0749       5           Vitals:    07/18/20 0300 07/18/20 0603 07/18/20 0702 07/18/20 1359   BP:  142/84 143/86 156/96   Pulse: 88 99 92 (!) 50   Patient Position - Orthostatic VS:    Lying         Visual Acuity  Visual Acuity      Most Recent Value   L Pupil Size (mm)  3   R Pupil Size (mm)  3   L Pupil Shape  Round   R Pupil Shape  Round          ED Medications  Medications   sodium chloride 0 9 % bolus 1,000 mL (0 mL Intravenous Stopped 7/15/20 0839)   ondansetron (ZOFRAN) injection 4 mg (4 mg Intravenous Given 7/15/20 0841)   sodium chloride 0 9 % bolus 1,000 mL (0 mL Intravenous Stopped 7/15/20 1025)   iohexol (OMNIPAQUE) 350 MG/ML injection (SINGLE-DOSE) 100 mL (100 mL Intravenous Given 7/15/20 0930)   lidocaine (URO-JET) 2 % urethral/mucosal gel 1 application (1 application Urethral Given 7/15/20 1107)   ondansetron (ZOFRAN) injection 4 mg (4 mg Intravenous Given 7/15/20 1033)   potassium chloride 20 mEq IVPB (premix) (20 mEq Intravenous New Bag 7/16/20 1215)   lactated ringers bolus 1,000 mL (0 mL Intravenous Stopped 7/17/20 0255)   potassium chloride 20 mEq IVPB (premix) (20 mEq Intravenous New Bag 7/17/20 1412)       Diagnostic Studies  Results Reviewed     Procedure Component Value Units Date/Time    CBC and differential [[id number]]  (Abnormal) Collected:  07/16/20 0008    Lab Status:  Final result Specimen:  Blood from Arm, Right Updated:  07/16/20 0641     WBC 3 46 Thousand/uL      RBC 4 55 Million/uL      Hemoglobin 15 1 g/dL      Hematocrit 44 5 %      MCV 98 fL      MCH 33 2 pg      MCHC 33 9 g/dL      RDW 12 5 %      MPV 9 5 fL      Platelets 453 Thousands/uL      nRBC 0 /100 WBCs     Narrative: This is an appended report  These results have been appended to a previously verified report      Magnesium [[id number]]  (Normal) Collected:  07/16/20 0433    Lab Status:  Final result Specimen:  Blood from Arm, Right Updated:  07/16/20 0555     Magnesium 2 2 mg/dL     Comprehensive metabolic panel [[id number]]  (Abnormal) Collected:  07/16/20 0433    Lab Status:  Final result Specimen:  Blood from Arm, Right Updated:  07/16/20 0551     Sodium 138 mmol/L      Potassium 3 4 mmol/L      Chloride 94 mmol/L      CO2 32 mmol/L      ANION GAP 12 mmol/L      BUN 44 mg/dL      Creatinine 1 30 mg/dL      Glucose 89 mg/dL      Calcium 8 5 mg/dL      AST 39 U/L       U/L      Alkaline Phosphatase 44 U/L      Total Protein 7 0 g/dL      Albumin 3 4 g/dL      Total Bilirubin 0 70 mg/dL      eGFR 61 ml/min/1 73sq m     Narrative:       [facility] guidelines for Chronic Kidney Disease (CKD):     Stage 1 with normal or high GFR (GFR > 90 mL/min/1 73 square meters)    Stage 2 Mild CKD (GFR = 60-89 mL/min/1 73 square meters)    Stage 3A Moderate CKD (GFR = 45-59 mL/min/1 73 square meters)    Stage 3B Moderate CKD (GFR = 30-44 mL/min/1 73 square meters)    Stage 4 Severe CKD (GFR = 15-29 mL/min/1 73 square meters)    Stage 5 End Stage CKD (GFR <15 mL/min/1 73 square meters)  Note: GFR calculation is accurate only with a steady state creatinine    UA w Reflex to Microscopic w Reflex to Culture [[id number]]  (Abnormal) Collected:  07/16/20 0118    Lab Status:  Final result Specimen:  Urine, Clean Catch Updated:  07/16/20 0126     Color, UA Jessi     Clarity, UA Clear     Specific [place], UA 1 020     pH, UA 5 5     Leukocytes, UA Negative     Nitrite, UA Negative     Protein, UA 30 (1+) mg/dl      Glucose, UA Negative mg/dl      Ketones, UA 15 (1+) mg/dl      Urobilinogen, UA 1 0 E U /dl      Bilirubin, UA Interference- unable to analyze     Blood, UA Negative    Sodium, urine, random [[id number]] Collected:  07/15/20 1453    Lab Status:  Final result Specimen:  Urine, Clean Catch Updated:  07/15/20 1938     Sodium, Ur 11    Creatinine, urine, random [[id number]] Collected:  07/15/20 1453    Lab Status:  Final result Specimen:  Urine, Clean Catch Updated:  07/15/20 1938     Creatinine, Ur 198 0 mg/dL     Basic metabolic panel [[id number]]  (Abnormal) Collected:  07/15/20 1835    Lab Status:  Final result Specimen:  Blood from Arm, Right Updated:  07/15/20 1856     Sodium 133 mmol/L      Potassium 3 9 mmol/L      Chloride 94 mmol/L      CO2 31 mmol/L      ANION GAP 8 mmol/L      BUN 39 mg/dL      Creatinine 1 34 mg/dL      Glucose 96 mg/dL      Calcium 8 9 mg/dL      eGFR 59 ml/min/1 73sq m     Narrative:       [facility] guidelines for Chronic Kidney Disease (CKD):     Stage 1 with normal or high GFR (GFR > 90 mL/min/1 73 square meters)    Stage 2 Mild CKD (GFR = 60-89 mL/min/1 73 square meters)    Stage 3A Moderate CKD (GFR = 45-59 mL/min/1 73 square meters)    Stage 3B Moderate CKD (GFR = 30-44 mL/min/1 73 square meters)    Stage 4 Severe CKD (GFR = 15-29 mL/min/1 73 square meters)    Stage 5 End Stage CKD (GFR <15 mL/min/1 73 square meters)  Note: GFR calculation is accurate only with a steady state creatinine    Hepatitis panel, acute [[id number]]  (Normal) Collected:  07/15/20 1145    Lab Status:  Final result Specimen:  Blood from Arm, Right Updated:  07/15/20 1700     Hepatitis B Surface Ag Non-reactive     Hep A IgM Non-reactive     Hepatitis C Ab Non-reactive     Hep B C IgM Non-reactive    Procalcitonin [[id number]]  (Abnormal) Collected:  07/15/20 1145    Lab Status:  Final result Specimen:  Blood from Arm, Right Updated:  07/15/20 1606     Procalcitonin 0 60 ng/ml     CEA [[id number]]  (Normal) Collected:  07/15/20 1258    Lab Status:  Final result Specimen:  Blood from Arm, Left Updated:  07/15/20 1556     CEA 2 0 ng/mL     Lactic acid, plasma [[id number]]  (Normal) Collected:  07/15/20 1459    Lab Status:  Final result Specimen:  Blood from Arm, Right Updated:  07/15/20 1532     LACTIC ACID 1 3 mmol/L     Narrative:       Result may be elevated if tourniquet was used during collection  Platelet count [[id number]]  (Normal) Collected:  07/15/20 1500    Lab Status:  Final result Specimen:  Blood from Arm, Right Updated:  07/15/20 1509     Platelets 398 Thousands/uL      MPV 9 2 fL     Novel Coronavirus (Covid-19),PCR [facility] [[id number]]  (Normal) Collected:  07/15/20 1132    Lab Status:  Final result Specimen:  Nares from Nose Updated:  07/15/20 1239     SARS-CoV-2 Negative    Narrative: The specimen collection materials, transport medium, and/or testing methodology utilized in the production of these test results have been proven to be reliable in a limited validation with an abbreviated program under the Emergency Utilization Authorization provided by the FDA  Testing reported as "Presumptive positive" will be confirmed with secondary testing with a reference laboratory to ensure result accuracy  Clinical caution and judgement should be used with the interpretation of these results with consideration of the clinical impression and other laboratory testing  Testing reported as "Positive" or "Negative" has been proven to be accurate according to standard laboratory validation requirements  All testing is performed with control materials showing appropriate reactivity at standard intervals        Troponin I [[id number]]  (Normal) Collected:  07/15/20 1145    Lab Status:  Final result Specimen:  Blood from Arm, Right Updated:  07/15/20 1220     Troponin I <0 02 ng/mL     Comprehensive metabolic panel [[id number]]  (Abnormal) Collected:  07/15/20 1145    Lab Status:  Final result Specimen:  Blood from Arm, Right Updated:  07/15/20 1216     Sodium 129 mmol/L      Potassium 4 4 mmol/L      Chloride 94 mmol/L      CO2 27 mmol/L      ANION GAP 8 mmol/L      BUN 35 mg/dL      Creatinine 1 30 mg/dL      Glucose 126 mg/dL      Calcium 9 0 mg/dL       U/L       U/L      Alkaline Phosphatase 51 U/L      Total Protein 7 3 g/dL      Albumin 3 6 g/dL      Total Bilirubin 0 70 mg/dL      eGFR 61 ml/min/1 73sq m     Narrative:       [place] guidelines for Chronic Kidney Disease (CKD):     Stage 1 with normal or high GFR (GFR > 90 mL/min/1 73 square meters)    Stage 2 Mild CKD (GFR = 60-89 mL/min/1 73 square meters)    Stage 3A Moderate CKD (GFR = 45-59 mL/min/1 73 square meters)    Stage 3B Moderate CKD (GFR = 30-44 mL/min/1 73 square meters)    Stage 4 Severe CKD (GFR = 15-29 mL/min/1 73 square meters)    Stage 5 End Stage CKD (GFR <15 mL/min/1 73 square meters)  Note: GFR calculation is accurate only with a steady state creatinine    Magnesium [[id number]]  (Normal) Collected:  07/15/20 1145    Lab Status:  Final result Specimen:  Blood from Arm, Right Updated:  07/15/20 1213     Magnesium 1 8 mg/dL     Phosphorus [[id number]]  (Abnormal) Collected:  07/15/20 1145    Lab Status:  Final result Specimen:  Blood from Arm, Right Updated:  07/15/20 1213     Phosphorus 5 8 mg/dL     Lactic acid, plasma [[id number]]  (Normal) Collected:  07/15/20 1034    Lab Status:  Final result Specimen:  Blood from Arm, Right Updated:  07/15/20 1056     LACTIC ACID 1 3 mmol/L     Narrative:       Result may be elevated if tourniquet was used during collection      Hepatic function panel [[id number]]  (Abnormal) Collected:  07/15/20 0805    Lab Status:  Final result Specimen:  Blood from Arm, Left Updated:  07/15/20 0850     Total Bilirubin 0 80 mg/dL      Bilirubin, Direct 0 25 mg/dL      Alkaline Phosphatase 59 U/L       U/L       U/L      Total Protein 8 7 g/dL      Albumin 4 3 g/dL     TSH [[id number]]  (Normal) Collected:  07/15/20 0805    Lab Status:  Final result Specimen:  Blood from Arm, Left Updated:  07/15/20 0850     TSH 3RD GENERATON 1 342 uIU/mL     Narrative:       Patients undergoing fluorescein dye angiography may retain small amounts of fluorescein in the body for 48-72 hours post procedure  Samples containing fluorescein can produce falsely depressed TSH values  If the patient had this procedure,a specimen should be resubmitted post fluorescein clearance        Magnesium [[id number]]  (Normal) Collected:  07/15/20 0805    Lab Status:  Final result Specimen:  Blood from Arm, Left Updated:  07/15/20 0850     Magnesium 2 0 mg/dL     Lipase [[id number]]  (Normal) Collected:  07/15/20 0805    Lab Status:  Final result Specimen:  Blood from Arm, Left Updated:  07/15/20 0850     Lipase 119 u/L     Basic metabolic panel [[id number]]  (Abnormal) Collected:  07/15/20 0805    Lab Status:  Final result Specimen:  Blood from Arm, Left Updated:  07/15/20 0837     Sodium 128 mmol/L      Potassium 4 6 mmol/L      Chloride 89 mmol/L      CO2 26 mmol/L      ANION GAP 13 mmol/L      BUN 35 mg/dL      Creatinine 1 67 mg/dL      Glucose 147 mg/dL      Calcium 10 4 mg/dL      eGFR 45 ml/min/1 73sq m     Narrative:       [facility] guidelines for Chronic Kidney Disease (CKD):     Stage 1 with normal or high GFR (GFR > 90 mL/min/1 73 square meters)    Stage 2 Mild CKD (GFR = 60-89 mL/min/1 73 square meters)    Stage 3A Moderate CKD (GFR = 45-59 mL/min/1 73 square meters)    Stage 3B Moderate CKD (GFR = 30-44 mL/min/1 73 square meters)    Stage 4 Severe CKD (GFR = 15-29 mL/min/1 73 square meters)    Stage 5 End Stage CKD (GFR <15 mL/min/1 73 square meters)  Note: GFR calculation is accurate only with a steady state creatinine    Troponin I [[id number]]  (Normal) Collected:  07/15/20 0805    Lab Status:  Final result Specimen:  Blood from Arm, Left Updated:  07/15/20 0834     Troponin I <0 02 ng/mL     Protime-INR [[id number]]  (Normal) Collected:  07/15/20 0805    Lab Status:  Final result Specimen:  Blood from Arm, Left Updated:  07/15/20 0821     Protime 12 3 seconds      INR 0 91    APTT [[id number]]  (Normal) Collected:  07/15/20 0805    Lab Status:  Final result Specimen:  Blood from Arm, Left Updated:  07/15/20 0821     PTT 25 seconds     CBC and differential [[id number]]  (Abnormal) Collected:  07/15/20 0805    Lab Status:  Final result Specimen:  Blood from Arm, Left Updated:  07/15/20 0813     WBC 6 24 Thousand/uL      RBC 5 20 Million/uL      Hemoglobin 17 3 g/dL      Hematocrit 49 7 %      MCV 96 fL      MCH 33 3 pg      MCHC 34 8 g/dL      RDW 12 3 %      MPV 9 3 fL      Platelets 083 Thousands/uL      nRBC 0 /100 WBCs      Neutrophils Relative 81 %      Immat GRANS % 0 %      Lymphocytes Relative 8 %      Monocytes Relative 10 %      Eosinophils Relative 0 %      Basophils Relative 1 %      Neutrophils Absolute 5 06 Thousands/µL      Immature Grans Absolute 0 02 Thousand/uL      Lymphocytes Absolute 0 50 Thousands/µL      Monocytes Absolute 0 63 Thousand/µL      Eosinophils Absolute 0 00 Thousand/µL      Basophils Absolute 0 03 Thousands/µL                  XR abdomen obstruction series   Final Result by Manpreet Shafer MD (07/18 1452)      Findings suspicious for small bowel obstruction, stable during the interim  Enteric feeding tube terminates in the stomach  Workstation performed: [id number]         XR abdomen obstruction series   Final Result by Belinda Riggs MD (07/16 1213)      Nasogastric tube has been placed with tip in the stomach  There is unchanged severe small bowel distention consistent with small bowel obstruction           Workstation performed: [id number]         CT abdomen pelvis with contrast   Final Result by Yosi Bradley MD (07/15 1008)      High-grade small bowel obstruction with the transition point noted in the right midabdomen (image 66 of series 601)  Maximal small bowel transverse diameter measures approximately 4 5 cm  Marked dilatation of the stomach secondary to the bowel    obstruction  No pneumoperitoneum or ascites  Surgical consultation is recommended  I personally discussed this study with Mikey Yoanna on 7/15/2020 at 10:05 AM                       Workstation performed: [id number]         XR chest 1 view portable   Final Result by Yosi Bradley MD (07/15 0930)      No acute cardiopulmonary disease  Elevated left hemidiaphragm with underlying gaseous distention of the left upper quadrant bowel loops  Consider dedicated abdominal imaging to further evaluate the bowel distention  Workstation performed: [id number]                    Procedures  Procedures         ED Course  ED Course as of Jul 20 2332   Wed Jul 15, 2020   1043 This EKG was interpreted by me  The EKG shows sinus tachycardia, normal intervals and axis, normal QRS, no acute ST changes present  US AUDIT      Most Recent Value   Initial Alcohol Screen: US AUDIT-C    1  How often do you have a drink containing alcohol? 6 Filed at: 07/15/2020 0756   2  How many drinks containing alcohol do you have on a typical day you are drinking? 4 Filed at: 07/15/2020 0756   3a  Male UNDER 65: How often do you have five or more drinks on one occasion? 6 Filed at: 07/15/2020 0756   Audit-C Score  (!) 16 Filed at: 07/15/2020 0756                                            MDM  Number of Diagnoses or Management Options  TACO (acute kidney injury) [facility]):   Dehydration:   Nausea and vomiting:   SBO (small bowel obstruction) ([facility] [address] ):   Transaminitis:   Diagnosis management comments: 58-year-old man comes in for evaluation of nausea vomiting  Will evaluate with CBC, CMP and lipase  Given his age, will get troponin EKG  Will get a CT abdomen pelvis    Disposition will be pending results of the workup  Disposition  Final diagnoses:   Nausea and vomiting   SBO (small bowel obstruction) ([facility])   Dehydration   [facility]   Transaminitis     Time reflects when diagnosis was documented in both MDM as applicable and the Disposition within this note     Time User Action Codes Description Comment    7/15/2020 10:41 AM Reginia Radha Add [R11 2] Nausea and vomiting     7/15/2020 10:41 AM Reginia Radha Add [W80 067] SBO (small bowel obstruction) ([facility] [address] )     7/15/2020 10:41 AM Reginia Radha Add [E86 0] Dehydration     7/15/2020 10:41 AM Reginia Radha Add [N17 9] TACO (acute kidney injury) ([facility] )     7/15/2020 10:42 AM Reginia Radha Add [R74 0] Transaminitis       ED Disposition     ED Disposition Condition Date/Time Comment    Admit Stable Wed Jul 15, 2020 10:41 AM SLIM admission        Follow-up Information    None         There are no discharge medications for this patient  No discharge procedures on file      PDMP Review       Value Time User    PDMP Reviewed  Yes 7/19/2020  1:05 PM Puneet Sadler PA-C          ED Provider  Electronically Signed by           Marianne Mitchell MD  07/20/20 4593

## 2020-07-21 NOTE — ANESTHESIA POSTPROCEDURE EVALUATION
Post-Op Assessment Note    CV Status:  Stable  Pain Score: 0    Pain management: adequate     Mental Status:  Alert and awake   Hydration Status:  Euvolemic   PONV Controlled:  Controlled   Airway Patency:  Patent   Post Op Vitals Reviewed: Yes      Staff: CRNA           BP   144/85   Temp   98 0   Pulse  107   Resp   20   SpO2   100

## 2020-07-21 NOTE — OP NOTE
OPERATIVE REPORT  PATIENT NAME: Quintin Roblero    :  1965  MRN: [id number]  Pt Location: [address] [place] [address] OR ROOM 03    SURGERY DATE: 2020    Surgeon(s) and Role:     Taj Retsrepo MD - Primary     * Chapo Muro PA-C - Assisting  The PA was necessary to provide expert assistance; i e  in the form of providing optimal exposure with retraction, suturing, and assistance with dissection in order to perform the most efficient operation and in order to optimize patient safety in the abscence of a qualified surgical resident  Preop Diagnosis:  Small bowel obstruction ([address] ) [K56 609]    Post-Op Diagnosis Codes:     * Small bowel obstruction ([address] ) [K56 609]    Procedure(s) (LRB):  LAPAROSCOPY DIAGNOSTIC, KYLE (N/A)    Specimen(s):  * No specimens in log *    Estimated Blood Loss:   25 mL    Drains:  NG/OG/Enteral Tube Nasogastric 18 Fr Left nares (Active)   Placement Reverification Auscultation 2020  6:01 PM   Site Assessment Clean; Intact;Dry 2020  6:01 PM   Status Suction-low continuous 2020  6:01 PM   Drainage Appearance Yellow 2020  6:01 PM   Output (mL) 400 mL 2020  9:41 PM   Number of days: 6       [REMOVED] Urethral Catheter Latex 16 Fr  (Removed)   Site Assessment Clean;Skin intact 2020  9:03 AM   Number of days: 0       Anesthesia Type:   General    Operative Indications:  Small bowel obstruction ([address] ) [K56 609]  Patient is a pleasant 39-year-old male presenting with a high-grade distal mechanical small bowel obstruction of uncertain etiology for which diagnostic laparoscopy is now indicated  Operative Findings:  Diagnostic laparoscopy performed  The patient was found to have a single congenital adhesive band bridging across the distal small-bowel  The small-bowel had slipped beneath it creating an internal hernia and obstructing the distal small-bowel  It was freely mobile    The adhesive band was divided laparoscopically and the procedure completed uneventfully  The small bowel run proximally and distally without evidence of additional pathology  The terminal ileum, cecum and ascending were well visualized and free from pathology  Complications:   None    Procedure and Technique:  Patient taken to the operating room where they are properly identified, monitored and anesthetized  The received antibiotics perioperatively  Sequential compression devices used for DVT prophylaxis  Nasogastric tube and Christianson catheter placed perioperatively  Abdomen prepped draped under sterile conditions using aseptic technique  Time-out performed  Skin incised above the umbilicus  Peritoneal cavity entered bluntly through a fascial defect at the umbilicus  12 mm trocar venous pneumoperitoneum established  Two additional 5 mm ports placed in the left abdomen  Diagnostic laparoscopy performed with the above findings noted  A single adhesive band was divided with Harmonic madhav freeing the obstructed small bowel  Small bowel run distally and proximally  No additional pathology identified and procedure completed with removal of all ports  Fascial defect at the umbilicus closed with a figure-of-eight suture of 0 Vicryl  Skin closed with subcuticular 4 Monocryl suture  Wounds infiltrated with 0 5% Marcaine dressed sterilely the patient extubated taken to recovery in stable condition     I was present for the entire procedure    Patient Disposition:  PACU     SIGNATURE: Rafael Haider MD  DATE: July 21, 2020  TIME: 9:29 AM

## 2020-07-22 VITALS
HEIGHT: 71 IN | OXYGEN SATURATION: 97 % | BODY MASS INDEX: 23.38 KG/M2 | RESPIRATION RATE: 18 BRPM | HEART RATE: 91 BPM | TEMPERATURE: 97.6 F | DIASTOLIC BLOOD PRESSURE: 95 MMHG | WEIGHT: 167 LBS | SYSTOLIC BLOOD PRESSURE: 145 MMHG

## 2020-07-22 PROBLEM — N17.9 ACUTE RENAL FAILURE (ARF) (HCC): Status: RESOLVED | Noted: 2020-07-15 | Resolved: 2020-07-22

## 2020-07-22 PROBLEM — K56.609 SMALL BOWEL OBSTRUCTION (HCC): Status: RESOLVED | Noted: 2020-07-15 | Resolved: 2020-07-22

## 2020-07-22 LAB
ANION GAP SERPL CALCULATED.3IONS-SCNC: 8 MMOL/L (ref 4–13)
BUN SERPL-MCNC: 16 MG/DL (ref 7–25)
CALCIUM SERPL-MCNC: 7.7 MG/DL (ref 8.6–10.5)
CHLORIDE SERPL-SCNC: 103 MMOL/L (ref 98–107)
CO2 SERPL-SCNC: 25 MMOL/L (ref 21–31)
CREAT SERPL-MCNC: 0.75 MG/DL (ref 0.7–1.3)
ERYTHROCYTE [DISTWIDTH] IN BLOOD BY AUTOMATED COUNT: 12.8 % (ref 11.5–14.5)
GFR SERPL CREATININE-BSD FRML MDRD: 103 ML/MIN/1.73SQ M
GLUCOSE SERPL-MCNC: 132 MG/DL (ref 65–99)
HCT VFR BLD AUTO: 35.5 % (ref 42–47)
HGB BLD-MCNC: 12.4 G/DL (ref 14–18)
MCH RBC QN AUTO: 33.7 PG (ref 26–34)
MCHC RBC AUTO-ENTMCNC: 34.8 G/DL (ref 31–37)
MCV RBC AUTO: 97 FL (ref 81–99)
PLATELET # BLD AUTO: 347 THOUSANDS/UL (ref 149–390)
PMV BLD AUTO: 7.5 FL (ref 8.6–11.7)
POTASSIUM SERPL-SCNC: 3.2 MMOL/L (ref 3.5–5.5)
RBC # BLD AUTO: 3.67 MILLION/UL (ref 4.3–5.9)
SODIUM SERPL-SCNC: 136 MMOL/L (ref 134–143)
WBC # BLD AUTO: 8.7 THOUSAND/UL (ref 4.8–10.8)

## 2020-07-22 PROCEDURE — C9113 INJ PANTOPRAZOLE SODIUM, VIA: HCPCS | Performed by: PHYSICIAN ASSISTANT

## 2020-07-22 PROCEDURE — NC001 PR NO CHARGE: Performed by: PHYSICIAN ASSISTANT

## 2020-07-22 PROCEDURE — 80048 BASIC METABOLIC PNL TOTAL CA: CPT | Performed by: PHYSICIAN ASSISTANT

## 2020-07-22 PROCEDURE — 99024 POSTOP FOLLOW-UP VISIT: CPT | Performed by: PHYSICIAN ASSISTANT

## 2020-07-22 PROCEDURE — 85027 COMPLETE CBC AUTOMATED: CPT | Performed by: PHYSICIAN ASSISTANT

## 2020-07-22 RX ORDER — ACETAMINOPHEN 325 MG/1
650 TABLET ORAL EVERY 6 HOURS PRN
Status: DISCONTINUED | OUTPATIENT
Start: 2020-07-22 | End: 2020-07-22 | Stop reason: HOSPADM

## 2020-07-22 RX ORDER — OXYCODONE HYDROCHLORIDE AND ACETAMINOPHEN 5; 325 MG/1; MG/1
2 TABLET ORAL EVERY 6 HOURS PRN
Status: DISCONTINUED | OUTPATIENT
Start: 2020-07-22 | End: 2020-07-22 | Stop reason: HOSPADM

## 2020-07-22 RX ORDER — INDOMETHACIN 25 MG/1
50 CAPSULE ORAL
Status: DISCONTINUED | OUTPATIENT
Start: 2020-07-22 | End: 2020-07-22

## 2020-07-22 RX ORDER — IBUPROFEN 600 MG/1
600 TABLET ORAL EVERY 6 HOURS PRN
Qty: 12 TABLET | Refills: 0 | Status: SHIPPED | OUTPATIENT
Start: 2020-07-22

## 2020-07-22 RX ORDER — OXYCODONE HYDROCHLORIDE AND ACETAMINOPHEN 5; 325 MG/1; MG/1
1 TABLET ORAL EVERY 6 HOURS PRN
Status: DISCONTINUED | OUTPATIENT
Start: 2020-07-22 | End: 2020-07-22 | Stop reason: HOSPADM

## 2020-07-22 RX ORDER — KETOROLAC TROMETHAMINE 30 MG/ML
15 INJECTION, SOLUTION INTRAMUSCULAR; INTRAVENOUS EVERY 12 HOURS SCHEDULED
Status: DISCONTINUED | OUTPATIENT
Start: 2020-07-22 | End: 2020-07-22

## 2020-07-22 RX ORDER — PREDNISONE 20 MG/1
40 TABLET ORAL DAILY
Status: DISCONTINUED | OUTPATIENT
Start: 2020-07-22 | End: 2020-07-22

## 2020-07-22 RX ORDER — POTASSIUM CHLORIDE 20 MEQ/1
20 TABLET, EXTENDED RELEASE ORAL ONCE
Status: DISCONTINUED | OUTPATIENT
Start: 2020-07-22 | End: 2020-07-22 | Stop reason: HOSPADM

## 2020-07-22 RX ADMIN — PANTOPRAZOLE SODIUM 40 MG: 40 INJECTION, POWDER, FOR SOLUTION INTRAVENOUS at 08:56

## 2020-07-22 RX ADMIN — KETOROLAC TROMETHAMINE 15 MG: 30 INJECTION, SOLUTION INTRAMUSCULAR at 00:50

## 2020-07-22 RX ADMIN — HEPARIN SODIUM 5000 UNITS: 5000 INJECTION INTRAVENOUS; SUBCUTANEOUS at 05:33

## 2020-07-22 RX ADMIN — SODIUM CHLORIDE 75 ML/HR: 0.9 INJECTION, SOLUTION INTRAVENOUS at 01:33

## 2020-07-22 NOTE — DISCHARGE INSTRUCTIONS
Bowel Obstruction   WHAT YOU NEED TO KNOW:   A bowel obstruction occurs when your large or small intestine is completely or partly blocked  The blockage prevents food and waste from passing through normally  DISCHARGE INSTRUCTIONS:   Follow up with your healthcare provider as directed:  Write down your questions so you remember to ask them during your visits  Contact your healthcare provider if:   · You have nausea and are vomiting  · Your abdomen is enlarged  · You cannot pass a bowel movement or gas  · You lose weight without trying  · You have blood in your bowel movement  · You have questions or concerns about your condition or care  Seek care immediately or call 911 if:   · You have severe abdominal pain that does not get better  · Your heart is beating faster than normal for you  · You have a fever  © 2017 [address]  Information is for End User's use only and may not be sold, redistributed or otherwise used for commercial purposes  All illustrations and images included in CareNotes® are the copyrighted property of A D A M , Inc  or Nader Shalini  The above information is an  only  It is not intended as medical advice for individual conditions or treatments  Talk to your doctor, nurse or pharmacist before following any medical regimen to see if it is safe and effective for you  How to Stop Smoking   WHAT YOU NEED TO KNOW:   You will improve your health and the health of others around you if you stop smoking  Your risk for heart and lung disease, cancer, stroke, heart attack, and vision problems will also decrease  You can benefit from quitting no matter how long you have smoked  DISCHARGE INSTRUCTIONS:   Prepare to stop smoking:  Nicotine is a highly addictive drug found in cigarettes  Withdrawal symptoms can happen when you stop smoking and make it hard to quit   These include anxiety, depression, irritability, trouble sleeping, and increased appetite  You increase your chances of success if you prepare to quit  · Set a quit date  Marie Nikoangelito a date that is within the next 2 weeks  Do not pick a day that you think may be stressful or busy  Write down the day or [place] it on your calender  · Tell friends and family that you plan to quit  Explain that you may have withdrawal symptoms when you try to quit  Ask them to support you  They may be able to encourage you and help reduce your stress to make it easier for you to quit  · Make a list of your reasons for quitting  Put the list somewhere you will see it every day, such as your refrigerator  You can look at the list when you have a craving  · Remove all tobacco and nicotine products from your home, car, and workplace  Also, remove anything else that will tempt you to smoke, such as lighters, matches, or ashtrays  Clean your car, home, and places at work that smell like smoke  The smell of smoke can trigger a craving  · Identify triggers that make you want to smoke  This may include activities, feelings, or people  Also write down 1 way you can deal with each of your triggers  For example, if you want to smoke as soon as you wake up, plan another activity during this time, such as exercise  · Make a plan for how you will quit  Learn about the tools that can help you quit, such as medicine, counseling, or nicotine replacement therapy  Choose at least 2 options to help you quit  Tools to help you stop smoking:   · Counseling  from a trained healthcare provider can provide you with support and skills to quit smoking  The provider will also teach you to manage your withdrawal symptoms and cravings  You may receive counseling from one counselor, in group therapy, or through phone therapy called a quit line  · Nicotine replacement therapy (NRT)  such as nicotine patches, gum, or lozenges may help reduce your nicotine cravings  You may get these without a doctor's order   Do not use e-cigarettes or smokeless tobacco in place of cigarettes or to help you quit  They still contain nicotine  · Prescription medicines  such as nasal sprays or nicotine inhalers may help reduce your withdrawal symptoms  Other medicines may also be used to reduce your urge to smoke  Ask your healthcare provider about these medicines  You may need to start certain medicines 2 weeks before your quit date for them to work well  · Hypnosis  is a practice that helps guide you through thoughts and feelings  Hypnosis may help decrease your cravings and make you more willing to quit  · Acupuncture therapy  uses very thin needles to balance energy channels in the body  This is thought to help decrease cravings and symptoms of nicotine withdrawal      · Support groups  let you talk to others who are trying to quit or have already quit  It may be helpful to speak with others about how they quit  Manage your cravings:   · Avoid situations, people, and places that tempt you to smoke  Go to nonsmoking places, such as libraries or restaurants  Understand what tempts you and try to avoid these things  · Keep your hands busy  Hold things such as a stress ball or pen  · Put candy or toothpicks in your mouth  Keep lollipops, sugarless gum, or toothpicks with you at all times  · Do not have alcohol or caffeine  These drinks may tempt you to smoke  Drink healthy liquids such as water or juice instead  · Reward yourself when you resist your cravings  Rewards will motivate you and help you stay positive  · Do an activity that distracts you from your craving  Examples include going for a walk, exercising, or cleaning  Prevent weight gain after you quit:  You may gain a few pounds after you quit smoking  It is healthier for you to gain a few pounds than to continue to smoke  The following can help you prevent weight gain:  · Eat healthy foods    These include fruits, vegetables, whole-grain breads, low-fat dairy products, beans, lean meats, and fish  Eat healthy snacks, such as low-fat yogurt, if you get hungry between meals  · Drink water before, during, and between meals  This will make your stomach feel full and help prevent you from overeating  Ask your healthcare provider how much liquid to drink each day and which liquids are best for you  · Exercise  Take a walk or do some kind of exercise every day  Ask your healthcare provider what exercise is right for you  This may help reduce your cravings and reduce stress  For support and more information:   · Smokefree  gov  Phone: 0- 426 - 395-1073  Web Address: www [employer]  [place]  © 2017 [address] [employer] Information is for End User's use only and may not be sold, redistributed or otherwise used for commercial purposes  All illustrations and images included in CareNotes® are the copyrighted property of [employer]  or Nader Loya  The above information is an  only  It is not intended as medical advice for individual conditions or treatments  Talk to your doctor, nurse or pharmacist before following any medical regimen to see if it is safe and effective for you

## 2020-07-22 NOTE — DISCHARGE SUMMARY
Discharge Summary - Charlotte Weaver 53 y o  male MRN: [id number]    Unit/Bed#: -02 Encounter: [id number]    Admission Date:   Admission Orders (From admission, onward)     Ordered        07/18/20 1436  Inpatient Admission  Once                     Admitting Diagnosis: Small bowel obstruction ([address] ) [K56 609]    HPI: Pleasant 52 yo M hx smoking and alcohol use admitted [facility] on 7/15 for signs/symptoms of mechanical SBO  Procedures Performed: No orders of the defined types were placed in this encounter  Summary of Hospital Course: Patient admitted and NG tube placed on 7/15/2020  Evaluated by general surgery and found to be stable for trial of non-operative management  Non-operative treatment continued on 7/16/2020  On 7/17/2020 patient had improvement of symptoms and ice chips started  On 7/18/2020 patient had persistent symptoms and high output from NG tube, patient was transferred to [facility] for possible surgical intervention  Between 7/18 and 7/19 patient had return of bowel function and resolution of symptoms  NG tube removed, procedure cancelled, and discharge planning started  Prior to discharge on 7/19 patient again had recurrent symptoms requiring continued hospitalization  Made NPO and abdominal x-rays ordered  On 7/20 patient found to have persistent signs/symptoms of SBO and recommendation made for surgery  Patient underwent diagnostic laparoscopy and lysis of a single adhesive band on 7/21/2020 with Dr Valencia Kyle  Post-op he was offered a soft diet  On 7/22/2020 patient found to be clinically, hemodynamically stable for discharge to home and outpatient follow-up  Internal medicine followed the patient during his hospitalization  CIWA protocol followed for his alcohol consumption  IV fluid resuscitation for TACO which resolved quickly  LFT elevations resolved with conservative care and hepatitis panel was negative      Significant Findings, Care, Treatment and Services Provided: Laparoscopic lysis of adhesion for SBO by Dr Ashley Traylor on 1/68/8988    Complications: None    Discharge Diagnosis: SBO secondary to adhesion    Resolved Problems  Date Reviewed: 7/21/2020          Resolved    * (Principal) Small bowel obstruction ([facility] ) 7/22/2020     Resolved by  Andie Muro PA-C    Acute renal failure (ARF) ([facility] ) 7/22/2020     Resolved by  Andie Muro PA-C          Condition at Discharge: stable         Discharge instructions/Information to patient and family:   See after visit summary for information provided to patient and family  Provisions for Follow-Up Care:  See after visit summary for information related to follow-up care and any pertinent home health orders  PCP: Naresh Humphrey MD    Disposition: Home    Planned Readmission: No      Discharge Statement   I spent 15 minutes discharging the patient  This time was spent on the day of discharge  I had direct contact with the patient on the day of discharge  Additional documentation is required if more than 30 minutes were spent on discharge  Discharge Medications:  See after visit summary for reconciled discharge medications provided to patient and family

## 2020-07-22 NOTE — PROGRESS NOTES
Progress Note - Quintin Roblero 1965, 53 y o  male MRN: [id number]    Unit/Bed#: -02 Encounter: [id number]    Primary Care Provider: Anny Pinto MD   Date and time admitted to hospital: 7/18/2020  2:07 PM        * Small bowel obstruction [facility])  Assessment & Plan  53year-old smoker hospital day 8 postop day 1 status post diagnostic laparoscopy with lysis of adhesions for mechanical small bowel obstruction  Patient reports no problems morning, tolerating diet, had a bowel movement yesterday, ambulating, pain controlled  Exam shows mild distention, nontender abdomen, incisions clean dry and intact  Vital signs stable on room air  Blood work shows mild anemia and hypokalemia  Plan:  Replace potassium orally  Continue soft diet for breakfast and lunch  Continue pain control  Likely discharge to home later today  Subjective/Objective   Chief Complaint: "I feel good"    Subjective:  Patient without complaints  Denies any abdominal pain  No nausea or vomiting  Tolerating his soft diet  Had a bowel movement yesterday  No fevers or chills  No chest pain or dyspnea  Objective:  No overnight events  Blood pressure 145/95, pulse 91, temperature 97 6 °F (36 4 °C), temperature source Temporal, resp  rate 18, height 5' 11" (1 803 m), weight 75 8 kg (167 lb), SpO2 97 %  ,Body mass index is 23 29 kg/m²  Intake/Output Summary (Last 24 hours) at 7/22/2020 0758  Last data filed at 7/21/2020 0921  Gross per 24 hour   Intake 800 ml   Output 125 ml   Net 675 ml       Invasive Devices     Peripheral Intravenous Line            Peripheral IV 07/20/20 Dorsal (posterior); Left Forearm 1 day    Peripheral IV 07/21/20 Right Hand less than 1 day              Physical Exam   Constitutional: He is oriented to person, place, and time  He appears well-developed and well-nourished  No distress  HENT:   Head: Normocephalic and atraumatic  Eyes: Pupils are equal, round, and reactive to light   EOM are normal    Neck: Normal range of motion  Cardiovascular: Normal rate and regular rhythm  No murmur heard  Pulmonary/Chest: Effort normal  No respiratory distress  He has wheezes (Mild expiratory wheezing)  Abdominal: Soft  Bowel sounds are normal  He exhibits distension ( mild)  There is no tenderness  Incisions clean dry and intact  Musculoskeletal: Normal range of motion  Neurological: He is alert and oriented to person, place, and time  Skin: Skin is warm and dry  Capillary refill takes less than 2 seconds  No rash noted  He is not diaphoretic  Psychiatric: He has a normal mood and affect  His behavior is normal    Nursing note and vitals reviewed  Lab, Imaging and other studies:  I have personally reviewed pertinent lab results    , CBC:   Lab Results   Component Value Date    WBC 8 70 07/22/2020    HGB 12 4 (L) 07/22/2020    HCT 35 5 (L) 07/22/2020    MCV 97 07/22/2020     07/22/2020    MCH 33 7 07/22/2020    MCHC 34 8 07/22/2020    RDW 12 8 07/22/2020    MPV 7 5 (L) 07/22/2020   , CMP:   Lab Results   Component Value Date    SODIUM 136 07/22/2020    K 3 2 (L) 07/22/2020     07/22/2020    CO2 25 07/22/2020    BUN 16 07/22/2020    CREATININE 0 75 07/22/2020    CALCIUM 7 7 (L) 07/22/2020    EGFR 103 07/22/2020     VTE Pharmacologic Prophylaxis: Heparin  VTE Mechanical Prophylaxis: sequential compression device

## 2020-07-22 NOTE — PLAN OF CARE
Problem: Potential for Falls  Goal: Patient will remain free of falls  Description  INTERVENTIONS:  - Assess patient frequently for physical needs  -  Identify cognitive and physical deficits and behaviors that affect risk of falls    -  [place] fall precautions as indicated by assessment   - Educate patient/family on patient safety including physical limitations  - Instruct patient to call for assistance with activity based on assessment  - Modify environment to reduce risk of injury  - Consider OT/PT consult to assist with strengthening/mobility  Outcome: Progressing     Problem: GASTROINTESTINAL - ADULT  Goal: Minimal or absence of nausea and/or vomiting  Description  INTERVENTIONS:  - Administer IV fluids if ordered to ensure adequate hydration  - Maintain NPO status until nausea and vomiting are resolved  - Nasogastric tube if ordered  - Administer ordered antiemetic medications as needed  - Provide nonpharmacologic comfort measures as appropriate  - Advance diet as tolerated, if ordered  - Consider nutrition services referral to assist patient with adequate nutrition and appropriate food choices  Outcome: Progressing  Goal: Maintains or returns to baseline bowel function  Description  INTERVENTIONS:  - Assess bowel function  - Encourage oral fluids to ensure adequate hydration  - Administer IV fluids if ordered to ensure adequate hydration  - Administer ordered medications as needed  - Encourage mobilization and activity  - Consider nutritional services referral to assist patient with adequate nutrition and appropriate food choices  Outcome: Progressing  Goal: Maintains adequate nutritional intake  Description  INTERVENTIONS:  - Monitor percentage of each meal consumed  - Identify factors contributing to decreased intake, treat as appropriate  - Assist with meals as needed  - Monitor I&O, weight, and lab values if indicated  - Obtain nutrition services referral as needed  Outcome: Progressing     Problem: METABOLIC, FLUID AND ELECTROLYTES - ADULT  Goal: Electrolytes maintained within normal limits  Description  INTERVENTIONS:  - Monitor labs and assess patient for signs and symptoms of electrolyte imbalances  - Administer electrolyte replacement as ordered  - Monitor response to electrolyte replacements, including repeat lab results as appropriate  - Instruct patient on fluid and nutrition as appropriate  Outcome: Progressing  Goal: Fluid balance maintained  Description  INTERVENTIONS:  - Monitor labs   - Monitor I/O and WT  - Instruct patient on fluid and nutrition as appropriate  - Assess for signs & symptoms of volume excess or deficit  Outcome: Progressing     Problem: SKIN/TISSUE INTEGRITY - ADULT  Goal: Skin integrity remains intact  Description  INTERVENTIONS  - Identify patients at risk for skin breakdown  - Assess and monitor skin integrity  - Assess and monitor nutrition and hydration status  - Monitor labs (i e  albumin)  - Assess for incontinence   - Turn and reposition patient  - Assist with mobility/ambulation  - Relieve pressure over bony prominences  - Avoid friction and shearing  - Provide appropriate hygiene as needed including keeping skin clean and dry  - Evaluate need for skin moisturizer/barrier cream  - Collaborate with interdisciplinary team (i e  Nutrition, Rehabilitation, etc )   - Patient/family teaching  Outcome: Progressing  Goal: Oral mucous membranes remain intact  Description  INTERVENTIONS  - Assess oral mucosa and hygiene practices  - Implement preventative oral hygiene regimen  - Implement oral medicated treatments as ordered  - Initiate Nutrition services referral as needed  Outcome: Progressing     Problem: MUSCULOSKELETAL - ADULT  Goal: Maintain or return mobility to safest level of function  Description  INTERVENTIONS:  - Assess patient's ability to carry out ADLs; assess patient's baseline for ADL function and identify physical deficits which impact ability to perform ADLs (bathing, care of mouth/teeth, toileting, grooming, dressing, etc )  - Assess/evaluate cause of self-care deficits   - Assess range of motion  - Assess patient's mobility  - Assess patient's need for assistive devices and provide as appropriate  - Encourage maximum independence but intervene and supervise when necessary  - Involve family in performance of ADLs  - Assess for home care needs following discharge   - Consider OT consult to assist with ADL evaluation and planning for discharge  - Provide patient education as appropriate  Outcome: Progressing     Problem: PAIN - ADULT  Goal: Verbalizes/displays adequate comfort level or baseline comfort level  Description  Interventions:  - Encourage patient to monitor pain and request assistance  - Assess pain using appropriate pain scale  - Administer analgesics based on type and severity of pain and evaluate response  - Implement non-pharmacological measures as appropriate and evaluate response  - Consider cultural and social influences on pain and pain management  - Notify physician/advanced practitioner if interventions unsuccessful or patient reports new pain  Outcome: Progressing     Problem: INFECTION - ADULT  Goal: Absence or prevention of progression during hospitalization  Description  INTERVENTIONS:  - Assess and monitor for signs and symptoms of infection  - Monitor lab/diagnostic results  - Monitor all insertion sites, i e  indwelling lines, tubes, and drains  - Monitor endotracheal if appropriate and nasal secretions for changes in amount and color  - [place] appropriate cooling/warming therapies per order  - Administer medications as ordered  - Instruct and encourage patient and family to use good hand hygiene technique  - Identify and instruct in appropriate isolation precautions for identified infection/condition  Outcome: Progressing  Goal: Absence of fever/infection during neutropenic period  Description  INTERVENTIONS:  - Monitor WBC    Outcome: Progressing     Problem: SAFETY ADULT  Goal: Maintain or return to baseline ADL function  Description  INTERVENTIONS:  -  Assess patient's ability to carry out ADLs; assess patient's baseline for ADL function and identify physical deficits which impact ability to perform ADLs (bathing, care of mouth/teeth, toileting, grooming, dressing, etc )  - Assess/evaluate cause of self-care deficits   - Assess range of motion  - Assess patient's mobility; develop plan if impaired  - Assess patient's need for assistive devices and provide as appropriate  - Encourage maximum independence but intervene and supervise when necessary  - Involve family in performance of ADLs  - Assess for home care needs following discharge   - Consider OT consult to assist with ADL evaluation and planning for discharge  - Provide patient education as appropriate  Outcome: Progressing  Goal: Maintain or return mobility status to optimal level  Description  INTERVENTIONS:  - Assess patient's baseline mobility status (ambulation, transfers, stairs, etc )    - Identify cognitive and physical deficits and behaviors that affect mobility  - Identify mobility aids required to assist with transfers and/or ambulation (gait belt, sit-to-stand, lift, walker, cane, etc )  - [place] fall precautions as indicated by assessment  - Record patient progress and toleration of activity level on Mobility SBAR; progress patient to next Phase/Stage  - Instruct patient to call for assistance with activity based on assessment  - Consider rehabilitation consult to assist with strengthening/weightbearing, etc   Outcome: Progressing     Problem: DISCHARGE PLANNING  Goal: Discharge to home or other facility with appropriate resources  Description  INTERVENTIONS:  - Identify barriers to discharge w/patient and caregiver  - Arrange for needed discharge resources and transportation as appropriate  - Identify discharge learning needs (meds, wound care, etc )  - Arrange for interpretive services to assist at discharge as needed  - Refer to Case Management Department for coordinating discharge planning if the patient needs post-hospital services based on physician/advanced practitioner order or complex needs related to functional status, cognitive ability, or social support system  Outcome: Progressing     Problem: Knowledge Deficit  Goal: Patient/family/caregiver demonstrates understanding of disease process, treatment plan, medications, and discharge instructions  Description  Complete learning assessment and assess knowledge base    Interventions:  - Provide teaching at level of understanding  - Provide teaching via preferred learning methods  Outcome: Progressing

## 2020-07-22 NOTE — ASSESSMENT & PLAN NOTE
41-year-old smoker hospital day 8 postop day 1 status post diagnostic laparoscopy with lysis of adhesions for mechanical small bowel obstruction  Patient reports no problems morning, tolerating diet, had a bowel movement yesterday, ambulating, pain controlled  Exam shows mild distention, nontender abdomen, incisions clean dry and intact  Vital signs stable on room air  Blood work shows mild anemia and hypokalemia  Plan:  Replace potassium orally  Continue soft diet for breakfast and lunch  Continue pain control  Likely discharge to home later today

## 2020-07-28 ENCOUNTER — TELEPHONE (OUTPATIENT)
Dept: SURGERY | Facility: CLINIC | Age: 55
End: 2020-07-28

## 2020-07-28 NOTE — TELEPHONE ENCOUNTER
Left patient a message that we need to change his appt to an earlier time on 8/6 due to a surgery in the afternoon

## 2020-08-05 ENCOUNTER — TELEPHONE (OUTPATIENT)
Dept: SURGERY | Facility: CLINIC | Age: 55
End: 2020-08-05

## 2020-08-06 ENCOUNTER — OFFICE VISIT (OUTPATIENT)
Dept: SURGERY | Facility: CLINIC | Age: 55
End: 2020-08-06

## 2020-08-06 VITALS — TEMPERATURE: 97.7 F

## 2020-08-06 DIAGNOSIS — K56.609 SMALL BOWEL OBSTRUCTION (HCC): Primary | ICD-10-CM

## 2020-08-06 PROCEDURE — 99024 POSTOP FOLLOW-UP VISIT: CPT | Performed by: PHYSICIAN ASSISTANT

## 2020-08-06 NOTE — ASSESSMENT & PLAN NOTE
Pleasant 19-year-old male doing well after laparoscopic lysis of adhesions for small-bowel obstruction  Reports return of his appetite and regular bowel movements  Denies recurrent abdominal pains or bloating  Overall improving daily, has mild fatigue that I explained can be normal after hospitalization and surgery with anesthesia, however that he should follow up with his primary care physician for further recommendations  Copy of the operative note provided and reviewed  All questions answered to his understanding satisfaction, will see back in 3-6 months to schedule colonoscopy

## 2020-08-06 NOTE — PROGRESS NOTES
Post-Op Note - General Surgery   Gerald Tillman 53 y o  male MRN: [id number]  Encounter: [id number]    Assessment/Plan    Small bowel obstruction [facility])  Gianni 69-year-old male doing well after laparoscopic lysis of adhesions for small-bowel obstruction  Reports return of his appetite and regular bowel movements  Denies recurrent abdominal pains or bloating  Overall improving daily, has mild fatigue that I explained can be normal after hospitalization and surgery with anesthesia, however that he should follow up with his primary care physician for further recommendations  Copy of the operative note provided and reviewed  All questions answered to his understanding satisfaction, will see back in 3-6 months to schedule colonoscopy  Diagnoses and all orders for this visit:    Small bowel obstruction [facility])        Subjective      Chief Complaint   Patient presents with    Post-op     Lap exp w/bowel resection 7/21/2020     Patient is here today po lap exp with bowel resection 7-  States that he gets worn out fast, no fever,chills  Good appetite, bowel movements are normal   Incision sites look good  Alfred Archer 69-year-old male here for follow-up after laparoscopic lysis adhesions for small-bowel obstruction  Reports feeling well  Denies abdominal pain  Has had dramatic increase in his appetite  Going to the bathroom regularly  Denies fevers or chills, chest pain and difficulty breathing  Reports poor endurance that is slowly improving  Review of Systems   Constitutional: Positive for fatigue  Negative for chills and fever  Respiratory: Negative for cough and shortness of breath  Cardiovascular: Negative for chest pain and palpitations  Gastrointestinal: Negative for abdominal pain, nausea and vomiting  All other systems reviewed and are negative        The following portions of the patient's history were reviewed and updated as appropriate: allergies, current medications, past family history, past medical history, past social history, past surgical history and problem list     Objective      Temperature 97 7 °F (36 5 °C), temperature source Temporal    Physical Exam  Vitals signs and nursing note reviewed  Constitutional:       General: He is not in acute distress  Appearance: He is well-developed  He is not diaphoretic  HENT:      Head: Normocephalic and atraumatic  Eyes:      Conjunctiva/sclera: Conjunctivae normal       Pupils: Pupils are equal, round, and reactive to light  Neck:      Musculoskeletal: Normal range of motion  Pulmonary:      Effort: No respiratory distress  Abdominal:      General: Abdomen is flat  Bowel sounds are normal  There is no distension  Palpations: Abdomen is soft  Tenderness: There is no abdominal tenderness  Comments: Abdomen flat, soft, nondistended, nontender  Incisions clean dry and intact without infection  Musculoskeletal: Normal range of motion  Skin:     General: Skin is warm and dry  Capillary Refill: Capillary refill takes less than 2 seconds  Neurological:      Mental Status: He is alert and oriented to person, place, and time     Psychiatric:         Behavior: Behavior normal          Signature:  Chapo Muro PA-C  Date: 8/6/2020 Time: 11:00 AM

## 2020-08-19 ENCOUNTER — APPOINTMENT (OUTPATIENT)
Dept: LAB | Facility: HOSPITAL | Age: 55
End: 2020-08-19
Payer: COMMERCIAL

## 2020-08-19 ENCOUNTER — TRANSCRIBE ORDERS (OUTPATIENT)
Dept: ADMINISTRATIVE | Facility: HOSPITAL | Age: 55
End: 2020-08-19

## 2020-08-19 DIAGNOSIS — E78.5 HYPERLIPIDEMIA, UNSPECIFIED HYPERLIPIDEMIA TYPE: ICD-10-CM

## 2020-08-19 DIAGNOSIS — E55.9 AVITAMINOSIS D: ICD-10-CM

## 2020-08-19 DIAGNOSIS — N42.9 DISEASE OF PROSTATE: ICD-10-CM

## 2020-08-19 DIAGNOSIS — R53.83 FATIGUE, UNSPECIFIED TYPE: Primary | ICD-10-CM

## 2020-08-19 DIAGNOSIS — R53.83 FATIGUE, UNSPECIFIED TYPE: ICD-10-CM

## 2020-08-19 LAB
25(OH)D3 SERPL-MCNC: 38.1 NG/ML (ref 30–100)
ANION GAP SERPL CALCULATED.3IONS-SCNC: 7 MMOL/L (ref 4–13)
BUN SERPL-MCNC: 7 MG/DL (ref 5–25)
CALCIUM SERPL-MCNC: 8.7 MG/DL (ref 8.3–10.1)
CHLORIDE SERPL-SCNC: 102 MMOL/L (ref 100–108)
CHOLEST SERPL-MCNC: 173 MG/DL (ref 50–200)
CO2 SERPL-SCNC: 27 MMOL/L (ref 21–32)
CREAT SERPL-MCNC: 0.98 MG/DL (ref 0.6–1.3)
ERYTHROCYTE [DISTWIDTH] IN BLOOD BY AUTOMATED COUNT: 12.6 % (ref 11.6–15.1)
GFR SERPL CREATININE-BSD FRML MDRD: 86 ML/MIN/1.73SQ M
GLUCOSE P FAST SERPL-MCNC: 97 MG/DL (ref 65–99)
HCT VFR BLD AUTO: 44.1 % (ref 36.5–49.3)
HCV AB SER QL: NORMAL
HDLC SERPL-MCNC: 65 MG/DL
HGB BLD-MCNC: 14.5 G/DL (ref 12–17)
LDLC SERPL CALC-MCNC: 97 MG/DL (ref 0–100)
MCH RBC QN AUTO: 32.4 PG (ref 26.8–34.3)
MCHC RBC AUTO-ENTMCNC: 32.9 G/DL (ref 31.4–37.4)
MCV RBC AUTO: 98 FL (ref 82–98)
NONHDLC SERPL-MCNC: 108 MG/DL
PLATELET # BLD AUTO: 358 THOUSANDS/UL (ref 149–390)
PMV BLD AUTO: 8.8 FL (ref 8.9–12.7)
POTASSIUM SERPL-SCNC: 4 MMOL/L (ref 3.5–5.3)
PSA SERPL-MCNC: 1.7 NG/ML (ref 0–4)
RBC # BLD AUTO: 4.48 MILLION/UL (ref 3.88–5.62)
SODIUM SERPL-SCNC: 136 MMOL/L (ref 136–145)
T4 FREE SERPL-MCNC: 1.17 NG/DL (ref 0.76–1.46)
TRIGL SERPL-MCNC: 57 MG/DL
TSH SERPL DL<=0.05 MIU/L-ACNC: 1.46 UIU/ML (ref 0.36–3.74)
WBC # BLD AUTO: 7.85 THOUSAND/UL (ref 4.31–10.16)

## 2020-08-19 PROCEDURE — 84439 ASSAY OF FREE THYROXINE: CPT

## 2020-08-19 PROCEDURE — 85027 COMPLETE CBC AUTOMATED: CPT

## 2020-08-19 PROCEDURE — 86803 HEPATITIS C AB TEST: CPT

## 2020-08-19 PROCEDURE — 80048 BASIC METABOLIC PNL TOTAL CA: CPT

## 2020-08-19 PROCEDURE — 36415 COLL VENOUS BLD VENIPUNCTURE: CPT

## 2020-08-19 PROCEDURE — 82306 VITAMIN D 25 HYDROXY: CPT

## 2020-08-19 PROCEDURE — 84443 ASSAY THYROID STIM HORMONE: CPT

## 2020-08-19 PROCEDURE — 80061 LIPID PANEL: CPT

## 2020-08-19 PROCEDURE — G0103 PSA SCREENING: HCPCS

## 2020-10-14 ENCOUNTER — TELEPHONE (OUTPATIENT)
Dept: SURGERY | Facility: CLINIC | Age: 55
End: 2020-10-14

## 2020-12-07 ENCOUNTER — OFFICE VISIT (OUTPATIENT)
Dept: SURGERY | Facility: CLINIC | Age: 55
End: 2020-12-07
Payer: COMMERCIAL

## 2020-12-07 VITALS
HEART RATE: 84 BPM | HEIGHT: 71 IN | WEIGHT: 180 LBS | BODY MASS INDEX: 25.2 KG/M2 | SYSTOLIC BLOOD PRESSURE: 142 MMHG | TEMPERATURE: 98 F | DIASTOLIC BLOOD PRESSURE: 84 MMHG | RESPIRATION RATE: 18 BRPM

## 2020-12-07 DIAGNOSIS — Z12.11 ENCOUNTER FOR SCREENING COLONOSCOPY: Primary | ICD-10-CM

## 2020-12-07 PROCEDURE — 99214 OFFICE O/P EST MOD 30 MIN: CPT | Performed by: PHYSICIAN ASSISTANT

## 2020-12-07 RX ORDER — SODIUM CHLORIDE, SODIUM LACTATE, POTASSIUM CHLORIDE, CALCIUM CHLORIDE 600; 310; 30; 20 MG/100ML; MG/100ML; MG/100ML; MG/100ML
125 INJECTION, SOLUTION INTRAVENOUS CONTINUOUS
Status: CANCELLED | OUTPATIENT
Start: 2020-12-07

## 2020-12-07 RX ORDER — SODIUM, POTASSIUM,MAG SULFATES 17.5-3.13G
1 SOLUTION, RECONSTITUTED, ORAL ORAL EVERY 12 HOURS SCHEDULED
Qty: 2 BOTTLE | Refills: 0 | Status: SHIPPED | OUTPATIENT
Start: 2020-12-07 | End: 2020-12-08

## 2020-12-14 ENCOUNTER — ANESTHESIA EVENT (OUTPATIENT)
Dept: GASTROENTEROLOGY | Facility: HOSPITAL | Age: 55
End: 2020-12-14

## 2020-12-15 ENCOUNTER — HOSPITAL ENCOUNTER (OUTPATIENT)
Dept: GASTROENTEROLOGY | Facility: HOSPITAL | Age: 55
Setting detail: OUTPATIENT SURGERY
Discharge: HOME | End: 2020-12-15
Attending: SURGERY | Admitting: SURGERY
Payer: COMMERCIAL

## 2020-12-15 ENCOUNTER — ANESTHESIA (OUTPATIENT)
Dept: GASTROENTEROLOGY | Facility: HOSPITAL | Age: 55
End: 2020-12-15

## 2020-12-15 VITALS
TEMPERATURE: 97 F | BODY MASS INDEX: 25.48 KG/M2 | HEIGHT: 71 IN | WEIGHT: 182 LBS | HEART RATE: 68 BPM | SYSTOLIC BLOOD PRESSURE: 115 MMHG | DIASTOLIC BLOOD PRESSURE: 70 MMHG | OXYGEN SATURATION: 100 % | RESPIRATION RATE: 16 BRPM

## 2020-12-15 VITALS — HEART RATE: 73 BPM

## 2020-12-15 DIAGNOSIS — Z12.11 ENCOUNTER FOR SCREENING COLONOSCOPY: ICD-10-CM

## 2020-12-15 PROBLEM — D12.5 ADENOMATOUS POLYP OF SIGMOID COLON: Status: ACTIVE | Noted: 2020-12-15

## 2020-12-15 PROBLEM — D12.2 ADENOMATOUS POLYP OF ASCENDING COLON: Status: ACTIVE | Noted: 2020-12-15

## 2020-12-15 PROCEDURE — 88305 TISSUE EXAM BY PATHOLOGIST: CPT | Performed by: PATHOLOGY

## 2020-12-15 PROCEDURE — 45384 COLONOSCOPY W/LESION REMOVAL: CPT | Performed by: SURGERY

## 2020-12-15 RX ORDER — SODIUM CHLORIDE, SODIUM LACTATE, POTASSIUM CHLORIDE, CALCIUM CHLORIDE 600; 310; 30; 20 MG/100ML; MG/100ML; MG/100ML; MG/100ML
125 INJECTION, SOLUTION INTRAVENOUS CONTINUOUS
Status: DISCONTINUED | OUTPATIENT
Start: 2020-12-15 | End: 2020-12-19 | Stop reason: HOSPADM

## 2020-12-15 RX ORDER — PROPOFOL 10 MG/ML
INJECTION, EMULSION INTRAVENOUS AS NEEDED
Status: DISCONTINUED | OUTPATIENT
Start: 2020-12-15 | End: 2020-12-15

## 2020-12-15 RX ORDER — LIDOCAINE HYDROCHLORIDE 20 MG/ML
INJECTION, SOLUTION EPIDURAL; INFILTRATION; INTRACAUDAL; PERINEURAL AS NEEDED
Status: DISCONTINUED | OUTPATIENT
Start: 2020-12-15 | End: 2020-12-15

## 2020-12-15 RX ADMIN — PROPOFOL 50 MG: 10 INJECTION, EMULSION INTRAVENOUS at 09:21

## 2020-12-15 RX ADMIN — PROPOFOL 50 MG: 10 INJECTION, EMULSION INTRAVENOUS at 09:25

## 2020-12-15 RX ADMIN — PROPOFOL 20 MG: 10 INJECTION, EMULSION INTRAVENOUS at 09:31

## 2020-12-15 RX ADMIN — PROPOFOL 50 MG: 10 INJECTION, EMULSION INTRAVENOUS at 09:41

## 2020-12-15 RX ADMIN — PROPOFOL 50 MG: 10 INJECTION, EMULSION INTRAVENOUS at 09:36

## 2020-12-15 RX ADMIN — PROPOFOL 50 MG: 10 INJECTION, EMULSION INTRAVENOUS at 09:50

## 2020-12-15 RX ADMIN — PROPOFOL 50 MG: 10 INJECTION, EMULSION INTRAVENOUS at 09:47

## 2020-12-15 RX ADMIN — PROPOFOL 50 MG: 10 INJECTION, EMULSION INTRAVENOUS at 09:28

## 2020-12-15 RX ADMIN — LIDOCAINE HYDROCHLORIDE 100 MG: 20 INJECTION, SOLUTION EPIDURAL; INFILTRATION; INTRACAUDAL; PERINEURAL at 09:14

## 2020-12-15 RX ADMIN — PROPOFOL 30 MG: 10 INJECTION, EMULSION INTRAVENOUS at 09:34

## 2020-12-15 RX ADMIN — SODIUM CHLORIDE, SODIUM LACTATE, POTASSIUM CHLORIDE, AND CALCIUM CHLORIDE 125 ML/HR: .6; .31; .03; .02 INJECTION, SOLUTION INTRAVENOUS at 08:15

## 2020-12-15 RX ADMIN — PROPOFOL 150 MG: 10 INJECTION, EMULSION INTRAVENOUS at 09:17

## 2020-12-15 RX ADMIN — PROPOFOL 50 MG: 10 INJECTION, EMULSION INTRAVENOUS at 09:44

## 2020-12-16 ENCOUNTER — TELEPHONE (OUTPATIENT)
Dept: SURGERY | Facility: CLINIC | Age: 55
End: 2020-12-16

## 2020-12-18 ENCOUNTER — TELEPHONE (OUTPATIENT)
Dept: SURGERY | Facility: CLINIC | Age: 55
End: 2020-12-18

## 2020-12-30 ENCOUNTER — TELEMEDICINE (OUTPATIENT)
Dept: SURGERY | Facility: CLINIC | Age: 55
End: 2020-12-30
Payer: COMMERCIAL

## 2020-12-30 DIAGNOSIS — D12.2 ADENOMATOUS POLYP OF ASCENDING COLON: Primary | ICD-10-CM

## 2020-12-30 PROBLEM — K63.5 HYPERPLASTIC POLYP OF SIGMOID COLON: Status: ACTIVE | Noted: 2020-12-15

## 2020-12-30 PROCEDURE — 99211 OFF/OP EST MAY X REQ PHY/QHP: CPT | Performed by: PHYSICIAN ASSISTANT

## (undated) DEVICE — SURGICAL CLIPPER BLADE GENERAL USE

## (undated) DEVICE — SUT VICRYL 3-0 SH 27 IN J416H

## (undated) DEVICE — GLOVE SRG BIOGEL 7

## (undated) DEVICE — PENCIL ROCKER SWITCH CAUTERY HAND CONTROL

## (undated) DEVICE — MAJOR LAPAROTOMY PACK: Brand: MEDLINE INDUSTRIES, INC.

## (undated) DEVICE — VIOLET BRAIDED (POLYGLACTIN 910), SYNTHETIC ABSORBABLE SUTURE: Brand: COATED VICRYL

## (undated) DEVICE — SUT VICRYL 0 UR-6 27 IN J603H

## (undated) DEVICE — SUT MONOCRYL 4-0 PS-2 27 IN Y426H

## (undated) DEVICE — 4-PORT MANIFOLD: Brand: NEPTUNE 2

## (undated) DEVICE — ADHESIVE SKIN HIGH VISCOSITY EXOFIN 1ML

## (undated) DEVICE — GARMENT,MEDLINE,DVT,INT,CALF,FOAM,MED: Brand: MEDLINE

## (undated) DEVICE — HARMONIC 1100 SHEARS, 36CM SHAFT LENGTH: Brand: HARMONIC

## (undated) DEVICE — 1840 FOAM BLOCK NEEDLE COUNTER: Brand: DEVON

## (undated) DEVICE — 5 MM CURVED DISSECTORS WITH MONOPOLAR CAUTERY: Brand: ENDOPATH

## (undated) DEVICE — MAYO STAND COVER: Brand: CONVERTORS

## (undated) DEVICE — GLOVE INDICATOR PI UNDERGLOVE SZ 7.5 BLUE

## (undated) DEVICE — SUT ETHILON 2-0 FS 18 IN 664H

## (undated) DEVICE — CHLORAPREP HI-LITE 26ML ORANGE

## (undated) DEVICE — SPONGE LAP 18 X 18 IN STRL RFD

## (undated) DEVICE — GAUZE SPONGES,16 PLY: Brand: CURITY

## (undated) DEVICE — GLOVE INDICATOR PI UNDERGLOVE SZ 7 BLUE

## (undated) DEVICE — TROCAR 12MM BLUNT TIP

## (undated) DEVICE — INTENDED FOR TISSUE SEPARATION, AND OTHER PROCEDURES THAT REQUIRE A SHARP SURGICAL BLADE TO PUNCTURE OR CUT.: Brand: BARD-PARKER ® CARBON RIB-BACK BLADES

## (undated) DEVICE — POOLE SUCTION HANDLE: Brand: CARDINAL HEALTH

## (undated) DEVICE — SUT SILK 3-0 SH CR/8 18 IN C013D

## (undated) DEVICE — STAPLER SKIN 35 WIDE ULC APPOSE

## (undated) DEVICE — TROCARS: Brand: KII® BALLOON BLUNT TIP SYSTEM

## (undated) DEVICE — NEEDLE 18 G X 1 1/2 SAFETY

## (undated) DEVICE — SUT PDS II 1 CTX 36 IN Z371T

## (undated) DEVICE — GLOVE SRG BIOGEL 7.5

## (undated) DEVICE — TROCAR: Brand: KII FIOS FIRST ENTRY

## (undated) DEVICE — TIBURON LAPAROTOMY DRAPE: Brand: CONVERTORS

## (undated) DEVICE — SUT VICRYL 4-0 SH-1 27 IN J218H

## (undated) DEVICE — SUT PDS II 1 CTX-B 36 IN ZB371

## (undated) DEVICE — ENDOPATH XCEL BLADELESS TROCARS WITH STABILITY SLEEVES: Brand: ENDOPATH XCEL

## (undated) DEVICE — SUT CHROMIC 0 CT-1 36 IN 924H

## (undated) DEVICE — NEEDLE 25G X 1 1/2

## (undated) DEVICE — GRASPER ATRAUMATIC FEN 5MM X 33CM ROTATING THUMB LOOP GENI-SURGE

## (undated) DEVICE — TOWEL SURG XR DETECT GREEN STRL RFD

## (undated) DEVICE — SYRINGE 10ML LL

## (undated) DEVICE — SUT VICRYL 0 REEL 54 IN J287G

## (undated) DEVICE — NEEDLE SPINAL 22G X 1 1/2

## (undated) DEVICE — LAPAROSCOPY PACK: Brand: MEDLINE INDUSTRIES, INC.

## (undated) DEVICE — [HIGH FLOW INSUFFLATOR,  DO NOT USE IF PACKAGE IS DAMAGED,  KEEP DRY,  KEEP AWAY FROM SUNLIGHT,  PROTECT FROM HEAT AND RADIOACTIVE SOURCES.]: Brand: PNEUMOSURE

## (undated) DEVICE — 3M™ IOBAN™ 2 ANTIMICROBIAL INCISE DRAPE 6650EZ: Brand: IOBAN™ 2